# Patient Record
Sex: MALE | Race: WHITE | Employment: UNEMPLOYED | URBAN - METROPOLITAN AREA
[De-identification: names, ages, dates, MRNs, and addresses within clinical notes are randomized per-mention and may not be internally consistent; named-entity substitution may affect disease eponyms.]

---

## 2020-10-18 ENCOUNTER — HOSPITAL ENCOUNTER (EMERGENCY)
Age: 55
Discharge: HOME | End: 2020-10-19
Attending: STUDENT IN AN ORGANIZED HEALTH CARE EDUCATION/TRAINING PROGRAM
Payer: COMMERCIAL

## 2020-10-18 PROCEDURE — 85652 RBC SED RATE AUTOMATED: CPT

## 2020-10-18 PROCEDURE — 99284 EMERGENCY DEPT VISIT MOD MDM: CPT

## 2020-10-18 ASSESSMENT — PAIN DESCRIPTION - LOCATION: LOCATION: HEAD;NECK

## 2020-10-18 ASSESSMENT — PAIN SCALES - GENERAL: PAINLEVEL_OUTOF10: 5

## 2020-10-18 ASSESSMENT — PAIN DESCRIPTION - PAIN TYPE: TYPE: ACUTE PAIN;CHRONIC PAIN

## 2020-10-19 ENCOUNTER — APPOINTMENT (OUTPATIENT)
Dept: CT IMAGING | Age: 55
End: 2020-10-19
Payer: COMMERCIAL

## 2020-10-19 VITALS
WEIGHT: 165 LBS | TEMPERATURE: 98.5 F | BODY MASS INDEX: 23.1 KG/M2 | HEART RATE: 65 BPM | DIASTOLIC BLOOD PRESSURE: 82 MMHG | SYSTOLIC BLOOD PRESSURE: 126 MMHG | HEIGHT: 71 IN | OXYGEN SATURATION: 98 % | RESPIRATION RATE: 18 BRPM

## 2020-10-19 PROCEDURE — 6370000000 HC RX 637 (ALT 250 FOR IP): Performed by: STUDENT IN AN ORGANIZED HEALTH CARE EDUCATION/TRAINING PROGRAM

## 2020-10-19 PROCEDURE — 70450 CT HEAD/BRAIN W/O DYE: CPT

## 2020-10-19 PROCEDURE — 72125 CT NECK SPINE W/O DYE: CPT

## 2020-10-19 RX ORDER — ACETAMINOPHEN 500 MG
1000 TABLET ORAL ONCE
Status: COMPLETED | OUTPATIENT
Start: 2020-10-19 | End: 2020-10-19

## 2020-10-19 RX ADMIN — ACETAMINOPHEN 1000 MG: 500 TABLET ORAL at 00:55

## 2020-10-19 ASSESSMENT — ENCOUNTER SYMPTOMS
PHOTOPHOBIA: 0
BACK PAIN: 0
VOMITING: 0
SHORTNESS OF BREATH: 0
NAUSEA: 0
RHINORRHEA: 0
ABDOMINAL PAIN: 0
SORE THROAT: 0

## 2020-10-19 NOTE — ED PROVIDER NOTES
[address] ED  EMERGENCY DEPARTMENT ENCOUNTER      Pt Name: Sammi Greene  MRN: [id number]  [place] 1965  Date of evaluation: 10/18/2020  Provider: Maurice Beaver, [address] [place] [address]       Chief Complaint   Patient presents with    Fall     Pt c/o fall on Monday, states he has had constant ringing in ears, migraine, and neck pain and tightness since the fall. HISTORY OF PRESENT ILLNESS   (Location/Symptom, Timing/Onset, Context/Setting, Quality, Duration, Modifying Factors, Severity)  Note limiting factors. Sammi Greene is a 54 y.o. male history of hypertension, seizure disorder,, headaches who presents to the emergency department complaining of headache, ringing in ears. Patient reports history of longstanding tinnitus however states has been worsening over the last week since a fall. Was no loss of consciousness, patient not on blood thinners. Patient states he has a bad right knee and states this felt like his knee gave out on him while he was in the bathroom 1 week ago, patient did fall hit the back of his neck and head. Is complaining today of mild headache not worse headache of life not thunderclap in presentation. Has been gradually worsening over the last 1 week. Denies focal deficit no numbness weakness in arms or legs. Denies vision change. Denies hearing loss. Patient is complaining of midline cervical spine pain. Nursing Notes were reviewed.     PAST MEDICAL HISTORY     Past Medical History:   Diagnosis Date    Back injury     Hypertension     Movement disorder     MRSA (methicillin resistant staph aureus) culture positive 9/10/12    knee abscess    Seizures (HCC)          SURGICAL HISTORY       Past Surgical History:   Procedure Laterality Date    UPPER GASTROINTESTINAL ENDOSCOPY  9/10/12         CURRENT MEDICATIONS       Previous Medications    ACETAMINOPHEN (TYLENOL) 325 MG TABLET    Take 650 mg by mouth every 6 hours as needed for Pain LANSOPRAZOLE (PREVACID) 30 MG DELAYED RELEASE CAPSULE    Take 1 capsule by mouth daily    ONDANSETRON (ZOFRAN) 4 MG TABLET    Take 1 tablet by mouth every 8 hours as needed for Nausea or Vomiting       ALLERGIES     Vicodin [hydrocodone-acetaminophen] and Darvocet [propoxyphene n-acetaminophen]    FAMILY HISTORY       Family History   Problem Relation Age of Onset    Heart Disease Mother     Diabetes Mother     Cancer Mother     Stroke Mother     Cancer Father     Stroke Father           SOCIAL HISTORY       Social History     Socioeconomic History    Marital status:      Spouse name: None    Number of children: None    Years of education: None    Highest education level: None   Occupational History    None   Social Needs    Financial resource strain: None    Food insecurity     Worry: None     Inability: None    Transportation needs     Medical: None     Non-medical: None   Tobacco Use    Smoking status: Current Every Day Smoker     Packs/day: 0.50     Years: 37.00     Pack years: 18.50     Types: Cigarettes    Smokeless tobacco: Never Used   Substance and Sexual Activity    Alcohol use: No    Drug use: No    Sexual activity: Yes     Partners: Female   Lifestyle    Physical activity     Days per week: None     Minutes per session: None    Stress: None   Relationships    Social connections     Talks on phone: None     Gets together: None     Attends Shinto service: None     Active member of club or organization: None     Attends meetings of clubs or organizations: None     Relationship status: None    Intimate partner violence     Fear of current or ex partner: None     Emotionally abused: None     Physically abused: None     Forced sexual activity: None   Other Topics Concern    None   Social History Narrative    None       SCREENINGS                            REVIEW OF SYSTEMS    (2-9 systems for level 4, 10 or more for level 5)   Review of Systems   Constitutional: Negative for chills, fatigue and fever. HENT: Positive for tinnitus. Negative for congestion, rhinorrhea and sore throat. Eyes: Negative for photophobia and visual disturbance. Respiratory: Negative for shortness of breath. Cardiovascular: Negative for chest pain and palpitations. Gastrointestinal: Negative for abdominal pain, nausea and vomiting. Genitourinary: Negative for decreased urine volume. Musculoskeletal: Positive for neck pain and neck stiffness. Negative for back pain. Skin: Negative for rash. Neurological: Positive for headaches. Negative for weakness and numbness. Psychiatric/Behavioral: Negative for confusion. PHYSICAL EXAM    (up to 7 for level 4, 8 or more for level 5)     ED Triage Vitals   BP Temp Temp src Pulse Resp SpO2 Height Weight   -- -- -- -- -- -- -- --       Physical Exam  Constitutional:       General: He is not in acute distress. Appearance: He is not diaphoretic. HENT:      Head: Normocephalic and atraumatic. Eyes:      Extraocular Movements: Extraocular movements intact. Pupils: Pupils are equal, round, and reactive to light. Comments: There is no nystagmus   Neck:      Trachea: No tracheal deviation. Comments: There is midline cervical pain on exam  Cardiovascular:      Rate and Rhythm: Normal rate and regular rhythm. Pulmonary:      Effort: Pulmonary effort is normal. No respiratory distress. Breath sounds: No stridor. No wheezing. Abdominal:      General: There is no distension. Palpations: Abdomen is soft. Tenderness: There is no abdominal tenderness. Musculoskeletal: Normal range of motion. General: No deformity. Skin:     General: Skin is warm and dry. Neurological:      General: No focal deficit present. Mental Status: He is alert and oriented to person, place, and time. Cranial Nerves: No cranial nerve deficit. Sensory: No sensory deficit. Motor: No weakness.          DIAGNOSTIC RESULTS EKG: All EKG's are interpreted by the Emergency Department Physician who either signs or Co-signs this chart in the absence of a cardiologist.            RADIOLOGY:   Non-plain film images such as CT, Ultrasound and MRI are read by the radiologist. Plain radiographic images are visualized and preliminarily interpreted by the emergency physician. Interpretation per the Radiologist below, if available at the time of this note:    CT HEAD WO CONTRAST   Final Result   1. No evidence of acute intracranial trauma. 2. No evidence of acute abnormality of the cervical spine. 3. C3/C4 mild, C6/C7 moderate central canal stenosis secondary to   encroachment by posterior disc osteophyte complex. 4. C3/C4 moderate right and mild left, C6/C7 moderate bilateral and C7/T1   moderate left neural foraminal stenosis secondary to encroachment by disc   osteophyte complex. CT CERVICAL SPINE WO CONTRAST   Final Result   1. No evidence of acute intracranial trauma. 2. No evidence of acute abnormality of the cervical spine. 3. C3/C4 mild, C6/C7 moderate central canal stenosis secondary to   encroachment by posterior disc osteophyte complex. 4. C3/C4 moderate right and mild left, C6/C7 moderate bilateral and C7/T1   moderate left neural foraminal stenosis secondary to encroachment by disc   osteophyte complex. LABS:  Labs Reviewed - No data to display    All other labs were within normal range or not returned as of this dictation. EMERGENCY DEPARTMENT COURSE and DIFFERENTIAL DIAGNOSIS/MDM:   Hakan Molina is a 54 y.o. male who presents to the emergency department with the complaint of tenderness, headache, chronic tinnitus, worsened last 1 week as well as new headache after a fall. History of headaches not worse headache of life no focal deficit on exam with normal strength sensation. Otherwise heart regular rhythm lungs are clear abdomen soft nontender.   Cranial nerves grossly intact pupils are equal round and reactive, EOMI, no nystagmus. Does have midline cervical pain on exam will obtain CT cervical spine imaging to rule out osseous abnormality. Chronic tenderness,? Worsened last week after fall, will obtain CT head to rule out intracranial pathology. CT imaging negative for acute osseous abnormality of skull, neck. No intracranial pathology. Patient to follow-up with PCP for further work-up and management with potential audiology, physical therapy for chronic neck pain. Patient was given return precautions for headache and neck pain including sudden severe change in pain, new vision change, focal deficit numbness weakness in arms or legs, worsening neck pain or stiffness. CRITICAL CARE TIME   Total Critical Care time was at least 0 minutes, excluding separately reportable procedures. There was a high probability of clinically significant/life threatening deterioration in the patient's condition which required my urgent intervention. Clinical concern   Intervention     CONSULTS:  None    PROCEDURES:  Unless otherwise noted below, none     Procedures        FINAL IMPRESSION      1. Closed head injury, initial encounter    2. Fall, initial encounter    3. Tinnitus of both ears    4. Cervical strain, acute, initial encounter          DISPOSITION/PLAN   DISPOSITION  dc      PATIENT REFERRED TO:  No follow-up provider specified. DISCHARGE MEDICATIONS:  New Prescriptions    No medications on file     Controlled Substances Monitoring:     RX Monitoring 2/20/2017   Periodic Controlled Substance Monitoring No signs of potential drug abuse or diversion identified.        (Please note that portions of this note were completed with a voice recognition program.  Efforts were made to edit the dictations but occasionally words are mis-transcribed.)    Keaton Tamayo DO (electronically signed)  Attending Emergency Physician            Keaton Tamayo DO  10/19/20 9942

## 2024-01-31 ENCOUNTER — APPOINTMENT (OUTPATIENT)
Dept: CARDIAC CATH/INVASIVE PROCEDURES | Age: 59
End: 2024-01-31
Payer: COMMERCIAL

## 2024-01-31 ENCOUNTER — HOSPITAL ENCOUNTER (INPATIENT)
Age: 59
LOS: 2 days | Discharge: HOME | End: 2024-02-02
Attending: STUDENT IN AN ORGANIZED HEALTH CARE EDUCATION/TRAINING PROGRAM | Admitting: INTERNAL MEDICINE
Payer: COMMERCIAL

## 2024-01-31 ENCOUNTER — APPOINTMENT (OUTPATIENT)
Dept: GENERAL RADIOLOGY | Age: 59
End: 2024-01-31
Payer: COMMERCIAL

## 2024-01-31 DIAGNOSIS — I21.3 ST ELEVATION MYOCARDIAL INFARCTION (STEMI), UNSPECIFIED ARTERY (HCC): ICD-10-CM

## 2024-01-31 DIAGNOSIS — R07.9 CHEST PAIN, UNSPECIFIED TYPE: Primary | ICD-10-CM

## 2024-01-31 PROBLEM — I21.19 ACUTE INFERIOR MYOCARDIAL INFARCTION (HCC): Status: ACTIVE | Noted: 2024-01-31

## 2024-01-31 LAB
ALBUMIN SERPL-MCNC: 4.2 G/DL (ref 3.4–5)
ALBUMIN/GLOB SERPL: 1.6 {RATIO} (ref 1.1–2.2)
ALP SERPL-CCNC: 71 U/L (ref 40–129)
ALT SERPL-CCNC: 11 U/L (ref 10–40)
ANION GAP SERPL CALCULATED.3IONS-SCNC: 11 MMOL/L (ref 3–16)
AST SERPL-CCNC: 16 U/L (ref 15–37)
BASOPHILS # BLD: 0.1 K/UL (ref 0–0.2)
BASOPHILS NFR BLD: 0.5 %
BILIRUB SERPL-MCNC: <0.2 MG/DL (ref 0–1)
BUN SERPL-MCNC: 12 MG/DL (ref 7–20)
CALCIUM SERPL-MCNC: 9.6 MG/DL (ref 8.3–10.6)
CHLORIDE SERPL-SCNC: 103 MMOL/L (ref 99–110)
CO2 SERPL-SCNC: 25 MMOL/L (ref 21–32)
CREAT SERPL-MCNC: 0.9 MG/DL (ref 0.9–1.3)
DEPRECATED RDW RBC AUTO: 14.2 % (ref 12.4–15.4)
EKG ATRIAL RATE: 46 BPM
EKG ATRIAL RATE: 67 BPM
EKG DIAGNOSIS: NORMAL
EKG DIAGNOSIS: NORMAL
EKG P AXIS: 79 DEGREES
EKG P AXIS: 81 DEGREES
EKG P-R INTERVAL: 178 MS
EKG P-R INTERVAL: 184 MS
EKG Q-T INTERVAL: 396 MS
EKG Q-T INTERVAL: 442 MS
EKG QRS DURATION: 102 MS
EKG QRS DURATION: 92 MS
EKG QTC CALCULATION (BAZETT): 386 MS
EKG QTC CALCULATION (BAZETT): 418 MS
EKG R AXIS: 23 DEGREES
EKG R AXIS: 79 DEGREES
EKG T AXIS: 58 DEGREES
EKG T AXIS: 84 DEGREES
EKG VENTRICULAR RATE: 46 BPM
EKG VENTRICULAR RATE: 67 BPM
EOSINOPHIL # BLD: 0.2 K/UL (ref 0–0.6)
EOSINOPHIL NFR BLD: 1.6 %
GFR SERPLBLD CREATININE-BSD FMLA CKD-EPI: >60 ML/MIN/{1.73_M2}
GLUCOSE SERPL-MCNC: 117 MG/DL (ref 70–99)
HCT VFR BLD AUTO: 44.1 % (ref 40.5–52.5)
HGB BLD-MCNC: 14.9 G/DL (ref 13.5–17.5)
INR PPP: 1.05 (ref 0.84–1.16)
LYMPHOCYTES # BLD: 3.3 K/UL (ref 1–5.1)
LYMPHOCYTES NFR BLD: 30.7 %
MCH RBC QN AUTO: 30.5 PG (ref 26–34)
MCHC RBC AUTO-ENTMCNC: 33.8 G/DL (ref 31–36)
MCV RBC AUTO: 90.1 FL (ref 80–100)
MONOCYTES # BLD: 0.7 K/UL (ref 0–1.3)
MONOCYTES NFR BLD: 6.7 %
NEUTROPHILS # BLD: 6.5 K/UL (ref 1.7–7.7)
NEUTROPHILS NFR BLD: 60.5 %
NT-PROBNP SERPL-MCNC: 90 PG/ML (ref 0–124)
PLATELET # BLD AUTO: 239 K/UL (ref 135–450)
PMV BLD AUTO: 7.6 FL (ref 5–10.5)
POC ACT LR: 194 SEC
POC ACT LR: 232 SEC
POTASSIUM SERPL-SCNC: 4.1 MMOL/L (ref 3.5–5.1)
PROT SERPL-MCNC: 6.9 G/DL (ref 6.4–8.2)
PROTHROMBIN TIME: 13.7 SEC (ref 11.5–14.8)
RBC # BLD AUTO: 4.9 M/UL (ref 4.2–5.9)
SODIUM SERPL-SCNC: 139 MMOL/L (ref 136–145)
TROPONIN, HIGH SENSITIVITY: 28 NG/L (ref 0–22)
TROPONIN, HIGH SENSITIVITY: 30 NG/L (ref 0–22)
WBC # BLD AUTO: 10.7 K/UL (ref 4–11)

## 2024-01-31 PROCEDURE — 96374 THER/PROPH/DIAG INJ IV PUSH: CPT

## 2024-01-31 PROCEDURE — 93458 L HRT ARTERY/VENTRICLE ANGIO: CPT | Performed by: INTERNAL MEDICINE

## 2024-01-31 PROCEDURE — 85025 COMPLETE CBC W/AUTO DIFF WBC: CPT

## 2024-01-31 PROCEDURE — 85610 PROTHROMBIN TIME: CPT

## 2024-01-31 PROCEDURE — B2111ZZ FLUOROSCOPY OF MULTIPLE CORONARY ARTERIES USING LOW OSMOLAR CONTRAST: ICD-10-PCS | Performed by: INTERNAL MEDICINE

## 2024-01-31 PROCEDURE — 99285 EMERGENCY DEPT VISIT HI MDM: CPT

## 2024-01-31 PROCEDURE — 2709999900 HC NON-CHARGEABLE SUPPLY

## 2024-01-31 PROCEDURE — 6370000000 HC RX 637 (ALT 250 FOR IP): Performed by: INTERNAL MEDICINE

## 2024-01-31 PROCEDURE — 80053 COMPREHEN METABOLIC PANEL: CPT

## 2024-01-31 PROCEDURE — 6370000000 HC RX 637 (ALT 250 FOR IP)

## 2024-01-31 PROCEDURE — C1725 CATH, TRANSLUMIN NON-LASER: HCPCS

## 2024-01-31 PROCEDURE — C1894 INTRO/SHEATH, NON-LASER: HCPCS

## 2024-01-31 PROCEDURE — C1874 STENT, COATED/COV W/DEL SYS: HCPCS

## 2024-01-31 PROCEDURE — 2500000003 HC RX 250 WO HCPCS

## 2024-01-31 PROCEDURE — 80061 LIPID PANEL: CPT

## 2024-01-31 PROCEDURE — C1760 CLOSURE DEV, VASC: HCPCS

## 2024-01-31 PROCEDURE — 6360000002 HC RX W HCPCS: Performed by: STUDENT IN AN ORGANIZED HEALTH CARE EDUCATION/TRAINING PROGRAM

## 2024-01-31 PROCEDURE — 2580000003 HC RX 258: Performed by: INTERNAL MEDICINE

## 2024-01-31 PROCEDURE — B41F1ZZ FLUOROSCOPY OF RIGHT LOWER EXTREMITY ARTERIES USING LOW OSMOLAR CONTRAST: ICD-10-PCS | Performed by: INTERNAL MEDICINE

## 2024-01-31 PROCEDURE — 83880 ASSAY OF NATRIURETIC PEPTIDE: CPT

## 2024-01-31 PROCEDURE — 93458 L HRT ARTERY/VENTRICLE ANGIO: CPT

## 2024-01-31 PROCEDURE — 36415 COLL VENOUS BLD VENIPUNCTURE: CPT

## 2024-01-31 PROCEDURE — 027135Z DILATION OF CORONARY ARTERY, TWO ARTERIES WITH TWO DRUG-ELUTING INTRALUMINAL DEVICES, PERCUTANEOUS APPROACH: ICD-10-PCS | Performed by: INTERNAL MEDICINE

## 2024-01-31 PROCEDURE — C1887 CATHETER, GUIDING: HCPCS

## 2024-01-31 PROCEDURE — 92928 PRQ TCAT PLMT NTRAC ST 1 LES: CPT | Performed by: INTERNAL MEDICINE

## 2024-01-31 PROCEDURE — 6360000002 HC RX W HCPCS: Performed by: INTERNAL MEDICINE

## 2024-01-31 PROCEDURE — 99152 MOD SED SAME PHYS/QHP 5/>YRS: CPT

## 2024-01-31 PROCEDURE — C1769 GUIDE WIRE: HCPCS

## 2024-01-31 PROCEDURE — 71045 X-RAY EXAM CHEST 1 VIEW: CPT

## 2024-01-31 PROCEDURE — 85347 COAGULATION TIME ACTIVATED: CPT

## 2024-01-31 PROCEDURE — 99222 1ST HOSP IP/OBS MODERATE 55: CPT | Performed by: INTERNAL MEDICINE

## 2024-01-31 PROCEDURE — 92941 PRQ TRLML REVSC TOT OCCL AMI: CPT

## 2024-01-31 PROCEDURE — 99291 CRITICAL CARE FIRST HOUR: CPT | Performed by: INTERNAL MEDICINE

## 2024-01-31 PROCEDURE — 84484 ASSAY OF TROPONIN QUANT: CPT

## 2024-01-31 PROCEDURE — 99153 MOD SED SAME PHYS/QHP EA: CPT

## 2024-01-31 PROCEDURE — 99152 MOD SED SAME PHYS/QHP 5/>YRS: CPT | Performed by: INTERNAL MEDICINE

## 2024-01-31 PROCEDURE — 6360000002 HC RX W HCPCS

## 2024-01-31 PROCEDURE — 4A023N7 MEASUREMENT OF CARDIAC SAMPLING AND PRESSURE, LEFT HEART, PERCUTANEOUS APPROACH: ICD-10-PCS | Performed by: INTERNAL MEDICINE

## 2024-01-31 PROCEDURE — 93005 ELECTROCARDIOGRAM TRACING: CPT | Performed by: INTERNAL MEDICINE

## 2024-01-31 PROCEDURE — 6360000004 HC RX CONTRAST MEDICATION: Performed by: INTERNAL MEDICINE

## 2024-01-31 PROCEDURE — 2000000000 HC ICU R&B

## 2024-01-31 PROCEDURE — 6370000000 HC RX 637 (ALT 250 FOR IP): Performed by: STUDENT IN AN ORGANIZED HEALTH CARE EDUCATION/TRAINING PROGRAM

## 2024-01-31 PROCEDURE — 93005 ELECTROCARDIOGRAM TRACING: CPT | Performed by: STUDENT IN AN ORGANIZED HEALTH CARE EDUCATION/TRAINING PROGRAM

## 2024-01-31 RX ORDER — NITROGLYCERIN 20 MG/100ML
5-200 INJECTION INTRAVENOUS CONTINUOUS
Status: DISCONTINUED | OUTPATIENT
Start: 2024-01-31 | End: 2024-02-01

## 2024-01-31 RX ORDER — ALPRAZOLAM 0.5 MG/1
0.25 TABLET ORAL 2 TIMES DAILY PRN
Status: DISCONTINUED | OUTPATIENT
Start: 2024-01-31 | End: 2024-02-02 | Stop reason: HOSPADM

## 2024-01-31 RX ORDER — ATROPINE SULFATE 1 MG/ML
0.5 INJECTION, SOLUTION INTRAVENOUS
Status: DISPENSED | OUTPATIENT
Start: 2024-01-31 | End: 2024-02-01

## 2024-01-31 RX ORDER — HEPARIN SODIUM 1000 [USP'U]/ML
2000 INJECTION, SOLUTION INTRAVENOUS; SUBCUTANEOUS PRN
Status: DISCONTINUED | OUTPATIENT
Start: 2024-01-31 | End: 2024-01-31

## 2024-01-31 RX ORDER — SODIUM CHLORIDE 0.9 % (FLUSH) 0.9 %
5-40 SYRINGE (ML) INJECTION EVERY 12 HOURS SCHEDULED
Status: DISCONTINUED | OUTPATIENT
Start: 2024-01-31 | End: 2024-02-02 | Stop reason: HOSPADM

## 2024-01-31 RX ORDER — ONDANSETRON 2 MG/ML
4 INJECTION INTRAMUSCULAR; INTRAVENOUS EVERY 6 HOURS PRN
Status: DISCONTINUED | OUTPATIENT
Start: 2024-01-31 | End: 2024-02-02 | Stop reason: HOSPADM

## 2024-01-31 RX ORDER — HEPARIN SODIUM 1000 [USP'U]/ML
4000 INJECTION, SOLUTION INTRAVENOUS; SUBCUTANEOUS ONCE
Status: COMPLETED | OUTPATIENT
Start: 2024-01-31 | End: 2024-01-31

## 2024-01-31 RX ORDER — SODIUM CHLORIDE 9 MG/ML
INJECTION, SOLUTION INTRAVENOUS PRN
Status: DISCONTINUED | OUTPATIENT
Start: 2024-01-31 | End: 2024-02-02 | Stop reason: HOSPADM

## 2024-01-31 RX ORDER — VALSARTAN 80 MG/1
40 TABLET ORAL EVERY 12 HOURS SCHEDULED
Status: DISCONTINUED | OUTPATIENT
Start: 2024-02-01 | End: 2024-02-02 | Stop reason: HOSPADM

## 2024-01-31 RX ORDER — MORPHINE SULFATE 2 MG/ML
2 INJECTION, SOLUTION INTRAMUSCULAR; INTRAVENOUS
Status: ACTIVE | OUTPATIENT
Start: 2024-01-31 | End: 2024-02-01

## 2024-01-31 RX ORDER — ASPIRIN 81 MG/1
324 TABLET, CHEWABLE ORAL ONCE
Status: COMPLETED | OUTPATIENT
Start: 2024-01-31 | End: 2024-01-31

## 2024-01-31 RX ORDER — SODIUM CHLORIDE 9 MG/ML
INJECTION, SOLUTION INTRAVENOUS CONTINUOUS
Status: ACTIVE | OUTPATIENT
Start: 2024-01-31 | End: 2024-01-31

## 2024-01-31 RX ORDER — ACETAMINOPHEN 325 MG/1
650 TABLET ORAL EVERY 4 HOURS PRN
Status: DISCONTINUED | OUTPATIENT
Start: 2024-01-31 | End: 2024-02-02 | Stop reason: HOSPADM

## 2024-01-31 RX ORDER — ATORVASTATIN CALCIUM 80 MG/1
80 TABLET, FILM COATED ORAL NIGHTLY
Status: DISCONTINUED | OUTPATIENT
Start: 2024-01-31 | End: 2024-02-02 | Stop reason: HOSPADM

## 2024-01-31 RX ORDER — HEPARIN SODIUM 1000 [USP'U]/ML
4000 INJECTION, SOLUTION INTRAVENOUS; SUBCUTANEOUS PRN
Status: DISCONTINUED | OUTPATIENT
Start: 2024-01-31 | End: 2024-01-31

## 2024-01-31 RX ORDER — EPTIFIBATIDE 0.75 MG/ML
2 INJECTION, SOLUTION INTRAVENOUS CONTINUOUS
Status: DISPENSED | OUTPATIENT
Start: 2024-01-31 | End: 2024-01-31

## 2024-01-31 RX ORDER — SODIUM CHLORIDE 0.9 % (FLUSH) 0.9 %
5-40 SYRINGE (ML) INJECTION PRN
Status: DISCONTINUED | OUTPATIENT
Start: 2024-01-31 | End: 2024-02-02 | Stop reason: HOSPADM

## 2024-01-31 RX ORDER — ACETAMINOPHEN 500 MG
1000 TABLET ORAL
Status: COMPLETED | OUTPATIENT
Start: 2024-01-31 | End: 2024-01-31

## 2024-01-31 RX ORDER — NITROGLYCERIN 0.4 MG/1
0.4 TABLET SUBLINGUAL EVERY 5 MIN PRN
Status: DISCONTINUED | OUTPATIENT
Start: 2024-01-31 | End: 2024-01-31

## 2024-01-31 RX ORDER — ASPIRIN 81 MG/1
81 TABLET, CHEWABLE ORAL DAILY
Status: DISCONTINUED | OUTPATIENT
Start: 2024-02-01 | End: 2024-02-02 | Stop reason: HOSPADM

## 2024-01-31 RX ORDER — HEPARIN SODIUM 10000 [USP'U]/100ML
5-30 INJECTION, SOLUTION INTRAVENOUS CONTINUOUS
Status: DISCONTINUED | OUTPATIENT
Start: 2024-01-31 | End: 2024-01-31

## 2024-01-31 RX ORDER — CLOPIDOGREL BISULFATE 75 MG/1
75 TABLET ORAL DAILY
Status: DISCONTINUED | OUTPATIENT
Start: 2024-02-01 | End: 2024-02-02 | Stop reason: HOSPADM

## 2024-01-31 RX ADMIN — HEPARIN SODIUM 4000 UNITS: 1000 INJECTION INTRAVENOUS; SUBCUTANEOUS at 09:45

## 2024-01-31 RX ADMIN — EPTIFIBATIDE 2 MCG/KG/MIN: 0.75 INJECTION INTRAVENOUS at 10:50

## 2024-01-31 RX ADMIN — ACETAMINOPHEN 1000 MG: 500 TABLET ORAL at 07:42

## 2024-01-31 RX ADMIN — ASPIRIN 324 MG: 81 TABLET, CHEWABLE ORAL at 09:46

## 2024-01-31 RX ADMIN — ATORVASTATIN CALCIUM 80 MG: 80 TABLET, FILM COATED ORAL at 20:14

## 2024-01-31 RX ADMIN — NITROGLYCERIN 0.4 MG: 0.4 TABLET, ORALLY DISINTEGRATING SUBLINGUAL at 07:42

## 2024-01-31 RX ADMIN — EPTIFIBATIDE 2 MCG/KG/MIN: 0.75 INJECTION INTRAVENOUS at 16:28

## 2024-01-31 RX ADMIN — IOPAMIDOL 200 ML: 755 INJECTION, SOLUTION INTRAVENOUS at 11:17

## 2024-01-31 RX ADMIN — SODIUM CHLORIDE: 9 INJECTION, SOLUTION INTRAVENOUS at 19:42

## 2024-01-31 RX ADMIN — ACETAMINOPHEN 650 MG: 325 TABLET ORAL at 20:14

## 2024-01-31 RX ADMIN — NITROGLYCERIN 0.4 MG: 0.4 TABLET, ORALLY DISINTEGRATING SUBLINGUAL at 07:58

## 2024-01-31 ASSESSMENT — PAIN DESCRIPTION - FREQUENCY
FREQUENCY: CONTINUOUS

## 2024-01-31 ASSESSMENT — PAIN DESCRIPTION - PAIN TYPE
TYPE: ACUTE PAIN

## 2024-01-31 ASSESSMENT — ENCOUNTER SYMPTOMS
EYES NEGATIVE: 1
VOMITING: 0
ABDOMINAL PAIN: 0
COUGH: 0
ALLERGIC/IMMUNOLOGIC NEGATIVE: 1
CHEST TIGHTNESS: 1
SHORTNESS OF BREATH: 0
TROUBLE SWALLOWING: 0
NAUSEA: 1
DIARRHEA: 0
PHOTOPHOBIA: 0

## 2024-01-31 ASSESSMENT — PAIN DESCRIPTION - LOCATION
LOCATION: ARM;CHEST
LOCATION: CHEST;ARM
LOCATION: GROIN
LOCATION: GROIN
LOCATION: CHEST;ARM

## 2024-01-31 ASSESSMENT — PAIN DESCRIPTION - DESCRIPTORS
DESCRIPTORS: PRESSURE

## 2024-01-31 ASSESSMENT — PAIN SCALES - GENERAL
PAINLEVEL_OUTOF10: 5
PAINLEVEL_OUTOF10: 5
PAINLEVEL_OUTOF10: 4
PAINLEVEL_OUTOF10: 4
PAINLEVEL_OUTOF10: 5

## 2024-01-31 ASSESSMENT — PAIN - FUNCTIONAL ASSESSMENT: PAIN_FUNCTIONAL_ASSESSMENT: 0-10

## 2024-01-31 ASSESSMENT — PAIN DESCRIPTION - ORIENTATION
ORIENTATION: LEFT

## 2024-01-31 NOTE — ED PROVIDER NOTES
in his father and mother; Diabetes in his mother; Heart Disease in his mother; Stroke in his father and mother.  He reports that he has been smoking cigarettes. He has a 18.5 pack-year smoking history. He has never used smokeless tobacco. He reports that he does not drink alcohol and does not use drugs.    Medications     Previous Medications    ACETAMINOPHEN (TYLENOL) 325 MG TABLET    Take 650 mg by mouth every 6 hours as needed for Pain    LANSOPRAZOLE (PREVACID) 30 MG DELAYED RELEASE CAPSULE    Take 1 capsule by mouth daily    ONDANSETRON (ZOFRAN) 4 MG TABLET    Take 1 tablet by mouth every 8 hours as needed for Nausea or Vomiting       Allergies     He is allergic to penicillins, vicodin [hydrocodone-acetaminophen], and darvocet [propoxyphene n-acetaminophen].    Physical Exam     INITIAL VITALS: BP: 126/87, Temp: 97.8 °F (36.6 °C), Pulse: 63, Respirations: 18, SpO2: 96 %   Physical Exam  Constitutional:       General: He is in acute distress.      Appearance: He is ill-appearing.   HENT:      Head: Normocephalic and atraumatic.      Nose: Nose normal. No congestion.      Mouth/Throat:      Mouth: Mucous membranes are moist.      Pharynx: Oropharynx is clear.   Eyes:      Extraocular Movements: Extraocular movements intact.      Conjunctiva/sclera: Conjunctivae normal.      Pupils: Pupils are equal, round, and reactive to light.   Cardiovascular:      Pulses: Normal pulses.      Heart sounds: Normal heart sounds. No murmur heard.     Comments: Equal pulses in all 4 extremities  Pulmonary:      Effort: Pulmonary effort is normal. No respiratory distress.      Breath sounds: Normal breath sounds.   Chest:      Chest wall: Tenderness present.   Abdominal:      General: Abdomen is flat.      Palpations: Abdomen is soft.      Tenderness: There is no abdominal tenderness. There is no guarding or rebound.   Musculoskeletal:         General: Normal range of motion.      Cervical back: Normal range of motion and neck  supple.      Right lower leg: No edema.      Left lower leg: No edema.   Skin:     General: Skin is warm and dry.      Capillary Refill: Capillary refill takes less than 2 seconds.   Neurological:      General: No focal deficit present.      Mental Status: He is alert and oriented to person, place, and time.      Cranial Nerves: No cranial nerve deficit.                    Hilario Perez MD  01/31/24 0944

## 2024-01-31 NOTE — PROGRESS NOTES
Pt. admitted to room 4508 from Cath lab. Pt. sinus nba on room air. Fem site having some bloody drainage but still soft. No complaints of pain at this time. Pt. wiped down with chlorhexidine and sacral heart placed. ICU team made aware of this patient. Bed alarm on and call light within reach.

## 2024-01-31 NOTE — PROGRESS NOTES
4 Eyes Admission Assessment     I agree as the admission nurse that 2 RN's have performed a thorough Head to Toe Skin Assessment on the patient. ALL assessment sites listed below have been assessed on admission.       Areas assessed by both nurses: yes  [x]   Head, Face, and Ears   [x]   Shoulders, Back, and Chest  [x]   Arms, Elbows, and Hands   [x]   Coccyx, Sacrum, and Ischium  [x]   Legs, Feet, and Heels        Does the Patient have Skin Breakdown?  Yes a wound was noted on the Admission Assessment and an LDA was Initiated documentation include the Melissa-wound, Wound Assessment, Measurements, Dressing Treatment, Drainage, and Color\",   -scattered scabs  -blanchable redness on sacrum        Richard Prevention initiated:  No   Wound Care Orders initiated:  No      C nurse consulted for Pressure Injury (Stage 3,4, Unstageable, DTI, NWPT, and Complex wounds) or Richard score 18 or lower:  No      Nurse 1 eSignature: Electronically signed by Chandni Mercer RN on 1/31/24 at 11:28 AM EST      Nurse 2 eSignature: Electronically signed by Lisa King RN on 1/31/24 at 11:30 AM EST

## 2024-01-31 NOTE — CONSULTS
ICU CONSULT       Hospital Day: 0                                                          Admit Date: 1/31/2024    PCP: Latrell Gibbs MD                                  CC: Chest Pain (Pt reports CP x3 days, states worse last night. Took aspirin and went to bed. This AM wife was bringing pt to ED and he passed out, she called 911 and he woke up and was given 1 nitro. Pain went from 10 to a 5. )       HISTORY OF PRESENT ILLNESS:     HPI:  Patient is a 58 y.o. M w/ PMHx of cardiac arrest in 1999, HTN, GERD, Epilepsy who p/w to the ED via EMS w/ CP. Reports that it started \"a couple days ago\" that felt pressure like, 5/10, mostly on the L-side. No reported SOB or radiation of pain. He didn't pay too much mind to it and said it went in and out. He said the pain would present mostly with activity. He works on cars, making transmissions and engines. He says yesterday evening, 01/30, he experienced 10/10 CP that felt like sharp and pressure like. He took an aspirin and tried to sleep it off, but woke up this morning with 10/10, he could not move L-arm, was extremely diaphoretic. He also had a really bad headache and nausea. His wife took him from [place] up to [place], but he was going in and out and the tightness felt worse, so they called EMS to get them. Denied any fevers, chills, diarrhea, vomiting, weakness in lower extremities, or changes in vision or hearing prior to episodes of CP.     ED course:  - Vitals stable  - Labs WNL  - Troponins: 28 -> 30  - Pro-BNP: 90  - LFTs: WNL  - INR: 1.05  - CXR: No acute cardiopulmonary disease  - First EKG normal, after activity, EKG showed acute STEMI  - Given nitro to help with pain  - Code STEMI called, sent to cath lab w/ Dr. Moreira.     PAST HISTORY:     PMHx:      Diagnosis Date    Back injury     Hypertension     Movement disorder     MRSA (methicillin resistant staph aureus) culture positive 9/10/12    knee abscess    Seizures (HCC)        PSurgHx:      Procedure  input(s): \"CRP\" in the last 72 hours.    ESR: No results for input(s): \"SEDRATE\" in the last 72 hours.      Microbiology:  Results       ** No results found for the last 336 hours. **             COVID-19: No results for input(s): \"COVID19\" in the last 72 hours.    U/A: No results for input(s): \"NITRITE\", \"COLORU\", \"PHUR\", \"LABCAST\", \"WBCUA\", \"RBCUA\", \"MUCUS\", \"TRICHOMONAS\", \"YEAST\", \"BACTERIA\", \"CLARITYU\", \"SPECGRAV\", \"LEUKOCYTESUR\", \"UROBILINOGEN\", \"BILIRUBINUR\", \"BLOODU\", \"GLUCOSEU\", \"KETONES\", \"AMORPHOUS\" in the last 72 hours.    Radiology:  XR CHEST PORTABLE   Final Result   1.  No acute cardiopulmonary findings.          EKG Interpretation:    Rhythm: sinus bradycardia  Rate: normal and 40-50  Axis: normal  Ectopy: none  Conduction: normal  ST Segments: elevation in  II, III, and aVf  T Waves: no acute change  Q Waves: none    Clinical Impression: myocardial infarction  anterior    Ruddy Daniels MD     MEDICATIONS     No current facility-administered medications on file prior to encounter.     Current Outpatient Medications on File Prior to Encounter   Medication Sig Dispense Refill    acetaminophen (TYLENOL) 325 MG tablet Take 650 mg by mouth every 6 hours as needed for Pain      ondansetron (ZOFRAN) 4 MG tablet Take 1 tablet by mouth every 8 hours as needed for Nausea or Vomiting 18 tablet 0    lansoprazole (PREVACID) 30 MG delayed release capsule Take 1 capsule by mouth daily 30 capsule 0         Scheduled Meds:   sodium chloride flush  5-40 mL IntraVENous 2 times per day    atorvastatin  80 mg Oral Nightly    [START ON 2/1/2024] valsartan  40 mg Oral 2 times per day    [START ON 2/1/2024] clopidogrel  75 mg Oral Daily    [START ON 2/1/2024] aspirin  81 mg Oral Daily      Continuous Infusions:   nitroGLYCERIN 20 mcg/min (01/31/24 1236)    sodium chloride 125 mL/hr at 01/31/24 1111    sodium chloride      eptifibatide 2 mcg/kg/min (01/31/24 1050)     PRN Meds:sodium chloride flush, sodium chloride,

## 2024-01-31 NOTE — ED NOTES
After ambulation to restroom pts chest pain increased from 4 to 7/10. Repeat EKG obtained and given to MD. Cath lab initiated at this time.      Rosalia Rome, RN  01/31/24 0953

## 2024-01-31 NOTE — CARE COORDINATION
Case management is following peripherally for discharge planning. The chart was reviewed. Mr. Bazzi is from home with his spouse. He is independent with self care and functional mobility at baseline. [facility] and Dr Gibbs is his PCP. It is anticipated he will be able to return home with no new needs. Please contact CM with questions or concerns.    Edilia Salinas, RN  [phone]

## 2024-01-31 NOTE — PROGRESS NOTES
01/31/24 1157   Encounter Summary   Encounter Overview/Reason  Crisis   Service Provided For: Patient and family together   Support System Family members   Last Encounter  01/31/24  (fransiscoed)   Complexity of Encounter High   Begin Time 0945   End Time  1015   Total Time Calculated 30 min   Assessment/Intervention/Outcome   Assessment Anxious   Intervention Active listening;Explored/Affirmed feelings, thoughts, concerns;Nurtured Hope   Outcome Comfort;Expressed feelings, needs, and concerns;Expressed Gratitude;Receptive     Family was also escorted to the the Cath Lab waiting room. No other needs at this time.  Staff , Pradeep Guzman MA, B[facility]

## 2024-01-31 NOTE — H&P
Interventional cardiology    Patient presented to the emergency room with some chest pain.  First EKG was nondiagnostic for STEMI second EKG after returning from the bathroom showed ST elevation in the inferior leads heart rate 49.  Blood pressure is 150/100    Physical exam soft S1-S2 no S3 gallop bradycardic rhythm lungs clear abdomen nondistended extremities no clubbing or edema.    Neurologic intact  Patient denies any significant medical allergies.  He has no chronic medical history.  He smokes a pack a day at least.    Emergency evaluation reveals Mallampati score 2 ASA score 2      Patient is critically ill clutching his chest he appears to have unstable angina with possible inferior STEMI.  Risk benefits alternatives briefly explained to the patient he wishes to proceed with angiography patient is critically ill critical care time 35 minutes

## 2024-01-31 NOTE — PROCEDURES
Patient:  Prasanth Bazzi  1965    Referring Doctor:  Latrell Gibbs MD     Procedure:  Cardiac Cath and PCI     Indication: inferior STEMI    Start time 0958  End time 1032.    2 mg versed IVP and 150 MCG fentanyl in divided doses given by my  order.    Meds given by qualified independent observer Channing QUINTANA.  The cath team monitored the Cardiopulmonary status of the pt for the entire procedure.      After informed consent was obtained and  History and exam reviewed the patient was taken to the cardiac cath lab. The patient had the right groin prepped and draped in sterile fashion. The groin was anesthetized with 1% Xylocaine. Using a thin walled needle the right femoral artery was entered and and .035 mm guide wire was inserted. A 6 Turkmen arterial introducer was advanced through the needle ane the wire was removed. The sheath was aspirated and flushed with normal saline.    A 5 Fr. left Carol catheter was advanced through the sheath over a guide wire and advanced under fluoroscopic guidance into the ascending aorta. The wire was withdrawn and the catheter was aspirated and flushed with normal saline. The catheter was then advanced into the ostium of the left coronary artery under fluoroscopic guidance.    Multiple cine images were obtained in different projections of the left coronary artery.  The catheter was then withdrawn.     A 6 Fr. JR 4 guide was subsequently advanced  Through the sheath over a wire and advanced under fluoroscopic guidance in to the ascending aorta. The guide wire was removed and the catheter was aspirated and flushed and subsequently advanced in to the ostium of the right coronary artery.     Multiple cine images were then obtained of the right coronary artery.    The catheter was then withdrawn.    RCA in LV with LVEDP 12.  No LVG.    The catheter was then reconnected to a pressure system for a pullback pressure analysis of the aortic valve.    The catheter had a guide wire

## 2024-01-31 NOTE — PROGRESS NOTES
Patient's fem site still oozing blood. Pressure held for 20 minutes and transparent dressing placed. No blood noted after dressing placed.

## 2024-01-31 NOTE — PROGRESS NOTES
findings.          Access:   -Central Access Day #:  None                                   -Peripheral Access Day#:2  -Arterial line Day#: None                                   Guzman Day#: None  NGT Day#:  None                                             ETT Day#: None  Vent Settings:    / / /     Medications:     No current facility-administered medications on file prior to encounter.     Current Outpatient Medications on File Prior to Encounter   Medication Sig Dispense Refill    acetaminophen (TYLENOL) 325 MG tablet Take 650 mg by mouth every 6 hours as needed for Pain      ondansetron (ZOFRAN) 4 MG tablet Take 1 tablet by mouth every 8 hours as needed for Nausea or Vomiting 18 tablet 0    lansoprazole (PREVACID) 30 MG delayed release capsule Take 1 capsule by mouth daily 30 capsule 0       Scheduled Meds:   potassium bicarb-citric acid  20 mEq Oral Once    sodium chloride flush  5-40 mL IntraVENous 2 times per day    atorvastatin  80 mg Oral Nightly    valsartan  40 mg Oral 2 times per day    clopidogrel  75 mg Oral Daily    aspirin  81 mg Oral Daily     Continuous Infusions:   nitroGLYCERIN 15 mcg/min (02/01/24 0609)    sodium chloride 5 mL/hr at 01/31/24 1942     PRN Meds:sodium chloride flush, sodium chloride, acetaminophen, atropine, morphine, ondansetron, ALPRAZolam      Assessment/Plan:     Prasanth Bazzi is a 58 y.o. male who p/w CP. Patient was admitted to the ICU for acute inferior STEMI s/p 2 stents in RCA.    Anterior STEMI   S/p 2 stents in RCA  Presented w/ worsening CP, radiation to L arm, diaphoresis & N. On admission, labs WNL, vitals stable. First EKG was normal. Cardiac pain based on description, so given Nitro, which helped with pain. Went to bathroom, but began experiencing worsening CP. EKG showed ST elevations in leads II, III and aVR. Code STEMI called and Dr. Moreira took patient to cath lab, placed 2 stents in RCA. On integrillin and nitro gtt. Labs pending in AM. Started on ARB,  DAPT, high-dose lipitor and ASA.   - Continue ASA 81 mg qdaily  - Continue high dose Lipitor 80 mg qnightly  - Continue Plavix 75 mg BID  - Continue Valsartan 40 mg BID  - Labs ok  - Off integrillin  - On nitro gtt     Seizure disorder  Reported seizure disorder per patient and chart review. Was seeing Dr. Cantu from [facility].  appointment w/ Dr Harden on 03/18/2016 - was on zonisamide, Tegretol, Keppra, Dilantin and Topamax. Eventually placed on Neurontin, but neurologist felt that was not suitable. He was started on Lamictal, he stopped 2/2 insurance issues. He reports Neurontin though, so unsure when he stopped taking Lamictal. He had a seizure a month ago and says he \"deals with them.\" Episodes are LOC, drops to ground, eye rolls back in head, violent jerking, prolonged up to 30 minutes - 1 hour. Previously had signs he was about to have one, such as floaters, spacing out, have retained consciousness with jerking as well. Overall, very complicated picture.   - Referral OP for seizures  - No seizures during admission     HTN  - On Valsartan 40 mg BID per cardiology     GERD  - Can add PPI if has symptoms of reflux    Code Status: Full Code  FEN: ADULT DIET; Regular; Low Fat/Low Chol/High Fiber/2 gm Na  PPX: Lovenox  DISPO: ICU    Ruddy Daniels MD  Internal Medicine, PGY-1  2/1/2024  7:25 AM      This patient has been staffed and discussed with Dr. Hilario Wilde.     Patient seen, examined and discussed with the resident and I agree with the assessment and plan as edited above.    Doing well after his STEMI with PCI to RCA.    Disposition per cardiology.    Hilario Wilde MD

## 2024-02-01 LAB
ANION GAP SERPL CALCULATED.3IONS-SCNC: 11 MMOL/L (ref 3–16)
BUN SERPL-MCNC: 8 MG/DL (ref 7–20)
CALCIUM SERPL-MCNC: 9.2 MG/DL (ref 8.3–10.6)
CHLORIDE SERPL-SCNC: 107 MMOL/L (ref 99–110)
CHOLEST SERPL-MCNC: 148 MG/DL (ref 0–199)
CO2 SERPL-SCNC: 22 MMOL/L (ref 21–32)
CREAT SERPL-MCNC: 0.8 MG/DL (ref 0.9–1.3)
DEPRECATED RDW RBC AUTO: 14.1 % (ref 12.4–15.4)
EKG ATRIAL RATE: 56 BPM
EKG DIAGNOSIS: NORMAL
EKG P AXIS: 70 DEGREES
EKG P-R INTERVAL: 190 MS
EKG Q-T INTERVAL: 424 MS
EKG QRS DURATION: 110 MS
EKG QTC CALCULATION (BAZETT): 409 MS
EKG R AXIS: -11 DEGREES
EKG T AXIS: 42 DEGREES
EKG VENTRICULAR RATE: 56 BPM
GFR SERPLBLD CREATININE-BSD FMLA CKD-EPI: >60 ML/MIN/{1.73_M2}
GLUCOSE SERPL-MCNC: 108 MG/DL (ref 70–99)
HCT VFR BLD AUTO: 41.5 % (ref 40.5–52.5)
HDLC SERPL-MCNC: 42 MG/DL (ref 40–60)
HGB BLD-MCNC: 14.1 G/DL (ref 13.5–17.5)
LDLC SERPL CALC-MCNC: 94 MG/DL
MAGNESIUM SERPL-MCNC: 1.9 MG/DL (ref 1.8–2.4)
MCH RBC QN AUTO: 30.9 PG (ref 26–34)
MCHC RBC AUTO-ENTMCNC: 34 G/DL (ref 31–36)
MCV RBC AUTO: 90.9 FL (ref 80–100)
PLATELET # BLD AUTO: 219 K/UL (ref 135–450)
PMV BLD AUTO: 7.6 FL (ref 5–10.5)
POTASSIUM SERPL-SCNC: 3.7 MMOL/L (ref 3.5–5.1)
RBC # BLD AUTO: 4.56 M/UL (ref 4.2–5.9)
SODIUM SERPL-SCNC: 140 MMOL/L (ref 136–145)
TRIGL SERPL-MCNC: 58 MG/DL (ref 0–150)
VLDLC SERPL CALC-MCNC: 12 MG/DL
WBC # BLD AUTO: 9 K/UL (ref 4–11)

## 2024-02-01 PROCEDURE — 6370000000 HC RX 637 (ALT 250 FOR IP)

## 2024-02-01 PROCEDURE — 80048 BASIC METABOLIC PNL TOTAL CA: CPT

## 2024-02-01 PROCEDURE — 85027 COMPLETE CBC AUTOMATED: CPT

## 2024-02-01 PROCEDURE — 6370000000 HC RX 637 (ALT 250 FOR IP): Performed by: INTERNAL MEDICINE

## 2024-02-01 PROCEDURE — 36415 COLL VENOUS BLD VENIPUNCTURE: CPT

## 2024-02-01 PROCEDURE — 83735 ASSAY OF MAGNESIUM: CPT

## 2024-02-01 PROCEDURE — 93010 ELECTROCARDIOGRAM REPORT: CPT | Performed by: INTERNAL MEDICINE

## 2024-02-01 PROCEDURE — 99232 SBSQ HOSP IP/OBS MODERATE 35: CPT | Performed by: INTERNAL MEDICINE

## 2024-02-01 PROCEDURE — 93306 TTE W/DOPPLER COMPLETE: CPT

## 2024-02-01 PROCEDURE — 1200000000 HC SEMI PRIVATE

## 2024-02-01 PROCEDURE — 2580000003 HC RX 258: Performed by: INTERNAL MEDICINE

## 2024-02-01 PROCEDURE — 99233 SBSQ HOSP IP/OBS HIGH 50: CPT | Performed by: INTERNAL MEDICINE

## 2024-02-01 RX ADMIN — SODIUM CHLORIDE, PRESERVATIVE FREE 10 ML: 5 INJECTION INTRAVENOUS at 20:47

## 2024-02-01 RX ADMIN — ATORVASTATIN CALCIUM 80 MG: 80 TABLET, FILM COATED ORAL at 20:47

## 2024-02-01 RX ADMIN — ASPIRIN 81 MG: 81 TABLET, CHEWABLE ORAL at 07:51

## 2024-02-01 RX ADMIN — CLOPIDOGREL BISULFATE 75 MG: 75 TABLET ORAL at 07:51

## 2024-02-01 RX ADMIN — VALSARTAN 40 MG: 80 TABLET, FILM COATED ORAL at 20:46

## 2024-02-01 RX ADMIN — VALSARTAN 40 MG: 80 TABLET, FILM COATED ORAL at 07:51

## 2024-02-01 RX ADMIN — POTASSIUM BICARBONATE 20 MEQ: 782 TABLET, EFFERVESCENT ORAL at 07:52

## 2024-02-01 ASSESSMENT — PAIN SCALES - GENERAL
PAINLEVEL_OUTOF10: 0

## 2024-02-01 ASSESSMENT — ENCOUNTER SYMPTOMS
ALLERGIC/IMMUNOLOGIC NEGATIVE: 1
SHORTNESS OF BREATH: 0
EYES NEGATIVE: 1
CHEST TIGHTNESS: 0
GASTROINTESTINAL NEGATIVE: 1

## 2024-02-01 NOTE — PLAN OF CARE
Problem: Discharge Planning  Goal: Discharge to home or other facility with appropriate resources  1/31/2024 2037 by Tommy Keenan RN  Outcome: Progressing     Problem: Pain  Goal: Verbalizes/displays adequate comfort level or baseline comfort level  1/31/2024 2037 by Tommy Keenan RN  Outcome: Progressing     Problem: Safety - Adult  Goal: Free from fall injury  1/31/2024 2037 by Tommy Keenan, RN  Outcome: Progressing     Problem: ABCDS Injury Assessment  Goal: Absence of physical injury  1/31/2024 2037 by Tommy Keenan RN  Outcome: Progressing     Problem: Skin/Tissue Integrity  Goal: Absence of new skin breakdown  Description: 1.  Monitor for areas of redness and/or skin breakdown  2.  Assess vascular access sites hourly  3.  Every 4-6 hours minimum:  Change oxygen saturation probe site  4.  Every 4-6 hours:  If on nasal continuous positive airway pressure, respiratory therapy assess nares and determine need for appliance change or resting period.  1/31/2024 2037 by Tommy Keenan, RN  Outcome: Progressing

## 2024-02-01 NOTE — PROGRESS NOTES
Bedside shift handoff report completed at this time. Neurovascular assessment completed; Right groin puncture site intact with no redness, draining, or hematoma. Site covered with transparent occlusive dressing. Patient hemodynamically stable at this time.

## 2024-02-01 NOTE — PROGRESS NOTES
Clinical Pharmacy Progress Note  Medication History     Admit Date: 1/31/2024        List of of current medications patient is taking is complete. Home Medication list in EPIC updated to reflect changes noted below.    Source of information: patient interview with help of wife at bedside    Confirmed that patient takes no Rx nor OTC medications at home.    Medications removed:   APAP prn OTC  Lansoprazole 30mg daily - from 2017  Ondansetron 4mg prn - from 2017      No current outpatient medications      Please call with any questions.  Anita Alexander PharmD., BCPS   2/1/2024 11:20 AM  Wireless: [phone]

## 2024-02-01 NOTE — PLAN OF CARE
Problem: Discharge Planning  Goal: Discharge to home or other facility with appropriate resources  Outcome: Adequate for Discharge     Problem: Pain  Goal: Verbalizes/displays adequate comfort level or baseline comfort level  Outcome: Adequate for Discharge     Problem: Safety - Adult  Goal: Free from fall injury  Outcome: Adequate for Discharge     Problem: ABCDS Injury Assessment  Goal: Absence of physical injury  Outcome: Adequate for Discharge     Problem: Skin/Tissue Integrity  Goal: Absence of new skin breakdown  Description: 1.  Monitor for areas of redness and/or skin breakdown  2.  Assess vascular access sites hourly  3.  Every 4-6 hours minimum:  Change oxygen saturation probe site  4.  Every 4-6 hours:  If on nasal continuous positive airway pressure, respiratory therapy assess nares and determine need for appliance change or resting period.  Outcome: Adequate for Discharge

## 2024-02-02 VITALS
HEART RATE: 65 BPM | WEIGHT: 170.86 LBS | RESPIRATION RATE: 16 BRPM | OXYGEN SATURATION: 96 % | HEIGHT: 71 IN | DIASTOLIC BLOOD PRESSURE: 85 MMHG | TEMPERATURE: 97.2 F | BODY MASS INDEX: 23.92 KG/M2 | SYSTOLIC BLOOD PRESSURE: 125 MMHG

## 2024-02-02 LAB
ANION GAP SERPL CALCULATED.3IONS-SCNC: 12 MMOL/L (ref 3–16)
BUN SERPL-MCNC: 10 MG/DL (ref 7–20)
CALCIUM SERPL-MCNC: 9.7 MG/DL (ref 8.3–10.6)
CHLORIDE SERPL-SCNC: 105 MMOL/L (ref 99–110)
CO2 SERPL-SCNC: 22 MMOL/L (ref 21–32)
CREAT SERPL-MCNC: 0.8 MG/DL (ref 0.9–1.3)
GFR SERPLBLD CREATININE-BSD FMLA CKD-EPI: >60 ML/MIN/{1.73_M2}
GLUCOSE SERPL-MCNC: 103 MG/DL (ref 70–99)
MAGNESIUM SERPL-MCNC: 2 MG/DL (ref 1.8–2.4)
POTASSIUM SERPL-SCNC: 3.7 MMOL/L (ref 3.5–5.1)
SODIUM SERPL-SCNC: 139 MMOL/L (ref 136–145)

## 2024-02-02 PROCEDURE — 6370000000 HC RX 637 (ALT 250 FOR IP): Performed by: STUDENT IN AN ORGANIZED HEALTH CARE EDUCATION/TRAINING PROGRAM

## 2024-02-02 PROCEDURE — 36415 COLL VENOUS BLD VENIPUNCTURE: CPT

## 2024-02-02 PROCEDURE — 99239 HOSP IP/OBS DSCHRG MGMT >30: CPT | Performed by: INTERNAL MEDICINE

## 2024-02-02 PROCEDURE — 83735 ASSAY OF MAGNESIUM: CPT

## 2024-02-02 PROCEDURE — 80048 BASIC METABOLIC PNL TOTAL CA: CPT

## 2024-02-02 PROCEDURE — 2580000003 HC RX 258: Performed by: STUDENT IN AN ORGANIZED HEALTH CARE EDUCATION/TRAINING PROGRAM

## 2024-02-02 RX ORDER — ASPIRIN 81 MG/1
81 TABLET, CHEWABLE ORAL DAILY
Qty: 30 TABLET | Refills: 3 | Status: SHIPPED | OUTPATIENT
Start: 2024-02-03

## 2024-02-02 RX ORDER — ATORVASTATIN CALCIUM 80 MG/1
80 TABLET, FILM COATED ORAL NIGHTLY
Qty: 30 TABLET | Refills: 3 | Status: SHIPPED | OUTPATIENT
Start: 2024-02-02

## 2024-02-02 RX ORDER — CLOPIDOGREL BISULFATE 75 MG/1
75 TABLET ORAL DAILY
Qty: 30 TABLET | Refills: 3 | Status: SHIPPED | OUTPATIENT
Start: 2024-02-03

## 2024-02-02 RX ORDER — VALSARTAN 40 MG/1
40 TABLET ORAL DAILY
Qty: 30 TABLET | Refills: 3 | Status: SHIPPED | OUTPATIENT
Start: 2024-02-02

## 2024-02-02 RX ADMIN — ASPIRIN 81 MG: 81 TABLET, CHEWABLE ORAL at 08:05

## 2024-02-02 RX ADMIN — CLOPIDOGREL BISULFATE 75 MG: 75 TABLET ORAL at 08:05

## 2024-02-02 RX ADMIN — VALSARTAN 40 MG: 80 TABLET, FILM COATED ORAL at 08:05

## 2024-02-02 RX ADMIN — SODIUM CHLORIDE, PRESERVATIVE FREE 10 ML: 5 INJECTION INTRAVENOUS at 08:05

## 2024-02-02 ASSESSMENT — PAIN SCALES - GENERAL
PAINLEVEL_OUTOF10: 0

## 2024-02-02 NOTE — DISCHARGE SUMMARY
[facility] Discharge Note      Patient ID: Prasanth Bazzi 58 y.o. 1965    Admission Date: 1/31/2024     Discharge Date:      Reason for Admission:  Principal Diagnosis: acute inferior wall MI.    Procedures:  No discharge procedures on file.    Brief Summary of Patient's Course:  Procedures   INSERT STENT EMANUEL CORONARY [[id number] (Custom)]        Pt presented with STEMI in ED after several days of stuttering severe chest pain.  2 hrs after ER admission pt developed inferior ST Elevation.  After RCA stenting with Aromas EMANUEL x 2 to mid RCA pt did well with no further chest pain.  LVEF normal on echo.    Stable and in good condition for discharge.  Minimal lopressor 12.5 mg q am due to HR chronically in 60- to 70s on monitor.    Follow up in about 2 weeks with me in offie.          Discharge Instructions:     Activity Limitations:  No heavy lifting.  May return to work in 7 days.  Resume activity  slowly     Diet:  low fat    Follow Up Instructions:  To office in: in about 2  weeks.  Call [phone] to make aappointment      Discharge Medications:     Medication List      START taking these medications    aspirin 81 MG chewable tablet  Take 1 tablet by mouth daily  Start taking on: February 3, 2024     atorvastatin 80 MG tablet  Commonly known as: LIPITOR  Take 1 tablet by mouth nightly     clopidogrel 75 MG tablet  Commonly known as: PLAVIX  Take 1 tablet by mouth daily  Start taking on: February 3, 2024     metoprolol tartrate 25 MG tablet  Commonly known as: LOPRESSOR  Take 0.5 tablets by mouth daily (with breakfast)     valsartan 40 MG tablet  Commonly known as: DIOVAN  Take 1 tablet by mouth daily        STOP taking these medications    acetaminophen 325 MG tablet  Commonly known as: TYLENOL     lansoprazole 30 MG delayed release capsule  Commonly known as: Prevacid     ondansetron 4 MG tablet  Commonly known as: Zofran           Where to Get Your Medications      These medications were sent to [facility]

## 2024-02-02 NOTE — PROGRESS NOTES
PIV removed. AVS reviewed w/ patient. Pt aware to  scripts. Educated on new medications as well as f/u appt on Feb 7th. PCA escorted pt to ride outside.

## 2024-02-02 NOTE — PROGRESS NOTES
Patient is alert and oriented x4.  No complaints of chest pain or SOB.   R groin site Angioseal dressing intact, no hematoma, no redness or tenderness. R distal pulses intact.   No additional needs at this time.   Call light within reach.   Standard Safety Precautions in place.

## 2024-02-02 NOTE — PLAN OF CARE
Problem: Discharge Planning  Goal: Discharge to home or other facility with appropriate resources  Outcome: Progressing  Flowsheets (Taken 2/1/2024 2000)  Discharge to home or other facility with appropriate resources:   Identify barriers to discharge with patient and caregiver   Arrange for needed discharge resources and transportation as appropriate   Identify discharge learning needs (meds, wound care, etc)   Refer to discharge planning if patient needs post-hospital services based on physician order or complex needs related to functional status, cognitive ability or social support system     Problem: Pain  Goal: Verbalizes/displays adequate comfort level or baseline comfort level  Outcome: Progressing  Flowsheets (Taken 2/1/2024 2000)  Verbalizes/displays adequate comfort level or baseline comfort level:   Encourage patient to monitor pain and request assistance   Assess pain using appropriate pain scale   Administer analgesics based on type and severity of pain and evaluate response   Implement non-pharmacological measures as appropriate and evaluate response   Consider cultural and social influences on pain and pain management   Notify Licensed Independent Practitioner if interventions unsuccessful or patient reports new pain  No complaints of pain this shift.      Problem: Safety - Adult  Goal: Free from fall injury  Outcome: Progressing  Flowsheets (Taken 2/1/2024 2000)  Free From Fall Injury: Instruct family/caregiver on patient safety     Problem: ABCDS Injury Assessment  Goal: Absence of physical injury  Outcome: Progressing  Flowsheets (Taken 2/1/2024 2000)  Absence of Physical Injury: Implement safety measures based on patient assessment     Problem: Skin/Tissue Integrity  Goal: Absence of new skin breakdown  Description: 1.  Monitor for areas of redness and/or skin breakdown  2.  Assess vascular access sites hourly  3.  Every 4-6 hours minimum:  Change oxygen saturation probe site  4.  Every 4-6

## 2024-02-07 ENCOUNTER — OFFICE VISIT (OUTPATIENT)
Dept: CARDIOLOGY CLINIC | Age: 59
End: 2024-02-07
Payer: COMMERCIAL

## 2024-02-07 VITALS
SYSTOLIC BLOOD PRESSURE: 120 MMHG | WEIGHT: 170 LBS | DIASTOLIC BLOOD PRESSURE: 60 MMHG | HEART RATE: 64 BPM | BODY MASS INDEX: 23.71 KG/M2

## 2024-02-07 DIAGNOSIS — I10 PRIMARY HYPERTENSION: ICD-10-CM

## 2024-02-07 DIAGNOSIS — I21.19 ACUTE INFERIOR MYOCARDIAL INFARCTION (HCC): Primary | ICD-10-CM

## 2024-02-07 PROCEDURE — 4004F PT TOBACCO SCREEN RCVD TLK: CPT | Performed by: NURSE PRACTITIONER

## 2024-02-07 PROCEDURE — G8427 DOCREV CUR MEDS BY ELIG CLIN: HCPCS | Performed by: NURSE PRACTITIONER

## 2024-02-07 PROCEDURE — 3074F SYST BP LT 130 MM HG: CPT | Performed by: NURSE PRACTITIONER

## 2024-02-07 PROCEDURE — G8420 CALC BMI NORM PARAMETERS: HCPCS | Performed by: NURSE PRACTITIONER

## 2024-02-07 PROCEDURE — 3017F COLORECTAL CA SCREEN DOC REV: CPT | Performed by: NURSE PRACTITIONER

## 2024-02-07 PROCEDURE — 3078F DIAST BP <80 MM HG: CPT | Performed by: NURSE PRACTITIONER

## 2024-02-07 PROCEDURE — 1111F DSCHRG MED/CURRENT MED MERGE: CPT | Performed by: NURSE PRACTITIONER

## 2024-02-07 PROCEDURE — 99214 OFFICE O/P EST MOD 30 MIN: CPT | Performed by: NURSE PRACTITIONER

## 2024-02-07 PROCEDURE — G8484 FLU IMMUNIZE NO ADMIN: HCPCS | Performed by: NURSE PRACTITIONER

## 2024-02-07 RX ORDER — METOPROLOL SUCCINATE 25 MG/1
12.5 TABLET, EXTENDED RELEASE ORAL DAILY
Qty: 30 TABLET | Refills: 3 | Status: SHIPPED | OUTPATIENT
Start: 2024-02-07

## 2024-02-07 NOTE — PROGRESS NOTES
[facility]     Outpatient Follow Up Note  Dr Valentin Ayala MD,  FACC   Zora White RN, APRN,CNP CVNP      CHIEF COMPLAINT / HPI: Prasanth Bazzi is 58 y.o. male who presents today with recent inferior STEMI 1/31/2024  two kay [place] EMANUEL to mid RCA.  / long time tobacco use states smoking less   States recovered alcoholic   LDL 94 recommend to be <60    Today VSS wt stable, no c/o chest painor sOB he c/o having fatigue and feels achy,  Discussed diet and exercise /she is trying to eat better   He is pleasant,  works in a garage,  active with a farm   Continue asa lipitor cozaar and low dose metoprolol (HR 50-60s)   Wants to walk at home not do Cardiac Rehab    right groin cath site no problems     2/1/2024 TTE  Normal left ventricle size, wall thickness, and systolic function with an estimated ejection fraction of 55-60%. No regional wall motion abnormalities are seen. Normal diastolic function. There is trivial mitral regurgitation. There is mild tricuspid valve regurgitation      I spent a total of 50 minutes and greater than 50% of the time was spent counseling with patient  coordinating care regarding her diagnosis, reviewing labs, cardiac work up, treatments and plan of care.    Past Medical History:   Diagnosis Date    Back injury     Hypertension     Movement disorder     MRSA (methicillin resistant staph aureus) culture positive 9/10/12    knee abscess    Seizures (HCC)      Social History:    Social History     Tobacco Use   Smoking Status Every Day    Current packs/day: 0.50    Average packs/day: 0.5 packs/day for 37.0 years (18.5 ttl pk-yrs)    Types: Cigarettes   Smokeless Tobacco Never     Current Medications:  Current Outpatient Medications   Medication Sig Dispense Refill    aspirin 81 MG chewable tablet Take 1 tablet by mouth daily 30 tablet 3    atorvastatin (LIPITOR) 80 MG tablet Take 1 tablet by mouth nightly 30 tablet 3    valsartan (DIOVAN) 40 MG tablet Take 1 tablet by mouth

## 2024-03-04 PROBLEM — I25.2 HISTORY OF ACUTE MYOCARDIAL INFARCTION: Status: ACTIVE | Noted: 2024-01-31

## 2024-03-04 PROBLEM — I21.19 ACUTE INFERIOR MYOCARDIAL INFARCTION (HCC): Status: RESOLVED | Noted: 2024-01-31 | Resolved: 2024-03-04

## 2024-06-04 NOTE — TELEPHONE ENCOUNTER
Requested Prescriptions     Pending Prescriptions Disp Refills    aspirin 81 MG chewable tablet 30 tablet 3     Sig: Take 1 tablet by mouth daily    clopidogrel (PLAVIX) 75 MG tablet 30 tablet 3     Sig: Take 1 tablet by mouth daily    atorvastatin (LIPITOR) 80 MG tablet 30 tablet 3     Sig: Take 1 tablet by mouth nightly    valsartan (DIOVAN) 40 MG tablet 30 tablet 3     Sig: Take 1 tablet by mouth daily          Last Office Visit: 2/7/2024     Next Office Visit: Visit date not found

## 2024-06-05 RX ORDER — ASPIRIN 81 MG/1
81 TABLET, CHEWABLE ORAL DAILY
Qty: 30 TABLET | Refills: 3 | Status: SHIPPED | OUTPATIENT
Start: 2024-06-05

## 2024-06-05 RX ORDER — VALSARTAN 40 MG/1
40 TABLET ORAL DAILY
Qty: 30 TABLET | Refills: 3 | Status: SHIPPED | OUTPATIENT
Start: 2024-06-05

## 2024-06-05 RX ORDER — CLOPIDOGREL BISULFATE 75 MG/1
75 TABLET ORAL DAILY
Qty: 30 TABLET | Refills: 3 | Status: SHIPPED | OUTPATIENT
Start: 2024-06-05

## 2024-06-05 RX ORDER — ATORVASTATIN CALCIUM 80 MG/1
80 TABLET, FILM COATED ORAL NIGHTLY
Qty: 30 TABLET | Refills: 3 | Status: SHIPPED | OUTPATIENT
Start: 2024-06-05

## 2025-02-17 ENCOUNTER — HOSPITAL ENCOUNTER (INPATIENT)
Age: 60
LOS: 1 days | Discharge: HOME | DRG: 321 | End: 2025-02-18
Attending: EMERGENCY MEDICINE | Admitting: INTERNAL MEDICINE
Payer: COMMERCIAL

## 2025-02-17 ENCOUNTER — APPOINTMENT (OUTPATIENT)
Dept: CT IMAGING | Age: 60
DRG: 321 | End: 2025-02-17
Payer: COMMERCIAL

## 2025-02-17 ENCOUNTER — APPOINTMENT (OUTPATIENT)
Dept: GENERAL RADIOLOGY | Age: 60
DRG: 321 | End: 2025-02-17
Payer: COMMERCIAL

## 2025-02-17 DIAGNOSIS — I21.4 NSTEMI (NON-ST ELEVATED MYOCARDIAL INFARCTION) (HCC): ICD-10-CM

## 2025-02-17 DIAGNOSIS — I20.0 UNSTABLE ANGINA (HCC): Primary | ICD-10-CM

## 2025-02-17 DIAGNOSIS — I21.3 STEMI (ST ELEVATION MYOCARDIAL INFARCTION) (HCC): ICD-10-CM

## 2025-02-17 PROBLEM — I25.10 CORONARY ARTERY DISEASE: Status: ACTIVE | Noted: 2025-02-17

## 2025-02-17 LAB
ANION GAP SERPL CALCULATED.3IONS-SCNC: 14 MMOL/L (ref 3–16)
ANTI-XA UNFRAC HEPARIN: <0.1 IU/ML (ref 0.3–0.7)
BASOPHILS # BLD: 0 K/UL (ref 0–0.2)
BASOPHILS NFR BLD: 0.2 %
BUN SERPL-MCNC: 10 MG/DL (ref 7–20)
CALCIUM SERPL-MCNC: 9.3 MG/DL (ref 8.3–10.6)
CHLORIDE SERPL-SCNC: 100 MMOL/L (ref 99–110)
CO2 SERPL-SCNC: 22 MMOL/L (ref 21–32)
CREAT SERPL-MCNC: 0.7 MG/DL (ref 0.9–1.3)
D-DIMER QUANTITATIVE: <0.27 UG/ML FEU (ref 0–0.6)
DEPRECATED RDW RBC AUTO: 14.2 % (ref 12.4–15.4)
DEPRECATED RDW RBC AUTO: 14.3 % (ref 12.4–15.4)
ECHO BSA: 1.92 M2
EKG ATRIAL RATE: 55 BPM
EKG DIAGNOSIS: NORMAL
EKG P AXIS: 78 DEGREES
EKG P-R INTERVAL: 178 MS
EKG Q-T INTERVAL: 434 MS
EKG QRS DURATION: 102 MS
EKG QTC CALCULATION (BAZETT): 415 MS
EKG R AXIS: -32 DEGREES
EKG T AXIS: 6 DEGREES
EKG VENTRICULAR RATE: 55 BPM
EOSINOPHIL # BLD: 0.1 K/UL (ref 0–0.6)
EOSINOPHIL NFR BLD: 0.4 %
GFR SERPLBLD CREATININE-BSD FMLA CKD-EPI: >90 ML/MIN/{1.73_M2}
GLUCOSE SERPL-MCNC: 124 MG/DL (ref 70–99)
HCT VFR BLD AUTO: 44.9 % (ref 40.5–52.5)
HCT VFR BLD AUTO: 48.7 % (ref 40.5–52.5)
HGB BLD-MCNC: 14.8 G/DL (ref 13.5–17.5)
HGB BLD-MCNC: 16.3 G/DL (ref 13.5–17.5)
LYMPHOCYTES # BLD: 3.6 K/UL (ref 1–5.1)
LYMPHOCYTES NFR BLD: 24.4 %
MAGNESIUM SERPL-MCNC: 1.92 MG/DL (ref 1.8–2.4)
MCH RBC QN AUTO: 30 PG (ref 26–34)
MCH RBC QN AUTO: 30.5 PG (ref 26–34)
MCHC RBC AUTO-ENTMCNC: 32.9 G/DL (ref 31–36)
MCHC RBC AUTO-ENTMCNC: 33.5 G/DL (ref 31–36)
MCV RBC AUTO: 91 FL (ref 80–100)
MCV RBC AUTO: 91 FL (ref 80–100)
MONOCYTES # BLD: 1.2 K/UL (ref 0–1.3)
MONOCYTES NFR BLD: 7.8 %
NEUTROPHILS # BLD: 10 K/UL (ref 1.7–7.7)
NEUTROPHILS NFR BLD: 67.2 %
PLATELET # BLD AUTO: 250 K/UL (ref 135–450)
PLATELET # BLD AUTO: 275 K/UL (ref 135–450)
PMV BLD AUTO: 7.5 FL (ref 5–10.5)
PMV BLD AUTO: 7.7 FL (ref 5–10.5)
POC ACT LR: 242 SEC
POC ACT LR: 248 SEC
POC ACT LR: 331 SEC
POTASSIUM SERPL-SCNC: 3.5 MMOL/L (ref 3.5–5.1)
RBC # BLD AUTO: 4.93 M/UL (ref 4.2–5.9)
RBC # BLD AUTO: 5.35 M/UL (ref 4.2–5.9)
SODIUM SERPL-SCNC: 136 MMOL/L (ref 136–145)
TROPONIN, HIGH SENSITIVITY: 1233 NG/L (ref 0–22)
TROPONIN, HIGH SENSITIVITY: 1269 NG/L (ref 0–22)
WBC # BLD AUTO: 13.9 K/UL (ref 4–11)
WBC # BLD AUTO: 14.8 K/UL (ref 4–11)

## 2025-02-17 PROCEDURE — 93005 ELECTROCARDIOGRAM TRACING: CPT | Performed by: EMERGENCY MEDICINE

## 2025-02-17 PROCEDURE — C1753 CATH, INTRAVAS ULTRASOUND: HCPCS | Performed by: INTERNAL MEDICINE

## 2025-02-17 PROCEDURE — 02703EZ DILATION OF CORONARY ARTERY, ONE ARTERY WITH TWO INTRALUMINAL DEVICES, PERCUTANEOUS APPROACH: ICD-10-PCS | Performed by: INTERNAL MEDICINE

## 2025-02-17 PROCEDURE — C1874 STENT, COATED/COV W/DEL SYS: HCPCS | Performed by: INTERNAL MEDICINE

## 2025-02-17 PROCEDURE — 6360000004 HC RX CONTRAST MEDICATION: Performed by: INTERNAL MEDICINE

## 2025-02-17 PROCEDURE — 83735 ASSAY OF MAGNESIUM: CPT

## 2025-02-17 PROCEDURE — 6360000002 HC RX W HCPCS: Performed by: INTERNAL MEDICINE

## 2025-02-17 PROCEDURE — 36415 COLL VENOUS BLD VENIPUNCTURE: CPT

## 2025-02-17 PROCEDURE — 85379 FIBRIN DEGRADATION QUANT: CPT

## 2025-02-17 PROCEDURE — B2151ZZ FLUOROSCOPY OF LEFT HEART USING LOW OSMOLAR CONTRAST: ICD-10-PCS | Performed by: INTERNAL MEDICINE

## 2025-02-17 PROCEDURE — 2500000003 HC RX 250 WO HCPCS: Performed by: INTERNAL MEDICINE

## 2025-02-17 PROCEDURE — 85027 COMPLETE CBC AUTOMATED: CPT

## 2025-02-17 PROCEDURE — C1894 INTRO/SHEATH, NON-LASER: HCPCS | Performed by: INTERNAL MEDICINE

## 2025-02-17 PROCEDURE — 2000000000 HC ICU R&B

## 2025-02-17 PROCEDURE — 71275 CT ANGIOGRAPHY CHEST: CPT

## 2025-02-17 PROCEDURE — 84484 ASSAY OF TROPONIN QUANT: CPT

## 2025-02-17 PROCEDURE — 92941 PRQ TRLML REVSC TOT OCCL AMI: CPT | Performed by: INTERNAL MEDICINE

## 2025-02-17 PROCEDURE — 92978 ENDOLUMINL IVUS OCT C 1ST: CPT | Performed by: INTERNAL MEDICINE

## 2025-02-17 PROCEDURE — 6360000004 HC RX CONTRAST MEDICATION: Performed by: EMERGENCY MEDICINE

## 2025-02-17 PROCEDURE — 71046 X-RAY EXAM CHEST 2 VIEWS: CPT

## 2025-02-17 PROCEDURE — 6370000000 HC RX 637 (ALT 250 FOR IP): Performed by: INTERNAL MEDICINE

## 2025-02-17 PROCEDURE — 93458 L HRT ARTERY/VENTRICLE ANGIO: CPT | Performed by: INTERNAL MEDICINE

## 2025-02-17 PROCEDURE — 99153 MOD SED SAME PHYS/QHP EA: CPT | Performed by: INTERNAL MEDICINE

## 2025-02-17 PROCEDURE — C1887 CATHETER, GUIDING: HCPCS | Performed by: INTERNAL MEDICINE

## 2025-02-17 PROCEDURE — 85025 COMPLETE CBC W/AUTO DIFF WBC: CPT

## 2025-02-17 PROCEDURE — 99285 EMERGENCY DEPT VISIT HI MDM: CPT

## 2025-02-17 PROCEDURE — 2580000003 HC RX 258: Performed by: INTERNAL MEDICINE

## 2025-02-17 PROCEDURE — 96374 THER/PROPH/DIAG INJ IV PUSH: CPT

## 2025-02-17 PROCEDURE — C1725 CATH, TRANSLUMIN NON-LASER: HCPCS | Performed by: INTERNAL MEDICINE

## 2025-02-17 PROCEDURE — 99152 MOD SED SAME PHYS/QHP 5/>YRS: CPT | Performed by: INTERNAL MEDICINE

## 2025-02-17 PROCEDURE — B2111ZZ FLUOROSCOPY OF MULTIPLE CORONARY ARTERIES USING LOW OSMOLAR CONTRAST: ICD-10-PCS | Performed by: INTERNAL MEDICINE

## 2025-02-17 PROCEDURE — C1769 GUIDE WIRE: HCPCS | Performed by: INTERNAL MEDICINE

## 2025-02-17 PROCEDURE — 6360000002 HC RX W HCPCS: Performed by: EMERGENCY MEDICINE

## 2025-02-17 PROCEDURE — 4A023N7 MEASUREMENT OF CARDIAC SAMPLING AND PRESSURE, LEFT HEART, PERCUTANEOUS APPROACH: ICD-10-PCS | Performed by: INTERNAL MEDICINE

## 2025-02-17 PROCEDURE — 85520 HEPARIN ASSAY: CPT

## 2025-02-17 PROCEDURE — 80048 BASIC METABOLIC PNL TOTAL CA: CPT

## 2025-02-17 PROCEDURE — 2709999900 HC NON-CHARGEABLE SUPPLY: Performed by: INTERNAL MEDICINE

## 2025-02-17 PROCEDURE — 85347 COAGULATION TIME ACTIVATED: CPT

## 2025-02-17 PROCEDURE — 6370000000 HC RX 637 (ALT 250 FOR IP): Performed by: EMERGENCY MEDICINE

## 2025-02-17 DEVICE — STENT CORONARY ONYX FRONTIER RX 4X30 MM ZOTAROLIMUS ELUT: Type: IMPLANTABLE DEVICE | Status: FUNCTIONAL

## 2025-02-17 DEVICE — STENT CORONARY ONYX FRONTIER RX 3X38 MM ZOTAROLIMUS ELUT: Type: IMPLANTABLE DEVICE | Status: FUNCTIONAL

## 2025-02-17 RX ORDER — HEPARIN SODIUM 1000 [USP'U]/ML
4000 INJECTION, SOLUTION INTRAVENOUS; SUBCUTANEOUS ONCE
Status: COMPLETED | OUTPATIENT
Start: 2025-02-17 | End: 2025-02-17

## 2025-02-17 RX ORDER — HEPARIN SODIUM 1000 [USP'U]/ML
INJECTION, SOLUTION INTRAVENOUS; SUBCUTANEOUS PRN
Status: DISCONTINUED | OUTPATIENT
Start: 2025-02-17 | End: 2025-02-17 | Stop reason: HOSPADM

## 2025-02-17 RX ORDER — HEPARIN SODIUM 10000 [USP'U]/100ML
5-30 INJECTION, SOLUTION INTRAVENOUS CONTINUOUS
Status: DISCONTINUED | OUTPATIENT
Start: 2025-02-17 | End: 2025-02-17

## 2025-02-17 RX ORDER — HEPARIN SODIUM 1000 [USP'U]/ML
4000 INJECTION, SOLUTION INTRAVENOUS; SUBCUTANEOUS PRN
Status: DISCONTINUED | OUTPATIENT
Start: 2025-02-17 | End: 2025-02-17

## 2025-02-17 RX ORDER — ONDANSETRON 4 MG/1
4 TABLET, ORALLY DISINTEGRATING ORAL EVERY 8 HOURS PRN
Status: DISCONTINUED | OUTPATIENT
Start: 2025-02-17 | End: 2025-02-18 | Stop reason: HOSPADM

## 2025-02-17 RX ORDER — LIDOCAINE HYDROCHLORIDE 10 MG/ML
INJECTION, SOLUTION EPIDURAL; INFILTRATION; INTRACAUDAL; PERINEURAL PRN
Status: DISCONTINUED | OUTPATIENT
Start: 2025-02-17 | End: 2025-02-17 | Stop reason: HOSPADM

## 2025-02-17 RX ORDER — ASPIRIN 81 MG/1
324 TABLET, CHEWABLE ORAL ONCE
Status: COMPLETED | OUTPATIENT
Start: 2025-02-17 | End: 2025-02-17

## 2025-02-17 RX ORDER — MIDAZOLAM HYDROCHLORIDE 1 MG/ML
INJECTION, SOLUTION INTRAMUSCULAR; INTRAVENOUS PRN
Status: DISCONTINUED | OUTPATIENT
Start: 2025-02-17 | End: 2025-02-17 | Stop reason: HOSPADM

## 2025-02-17 RX ORDER — ASPIRIN 81 MG/1
81 TABLET, CHEWABLE ORAL DAILY
Status: DISCONTINUED | OUTPATIENT
Start: 2025-02-18 | End: 2025-02-18 | Stop reason: HOSPADM

## 2025-02-17 RX ORDER — ATORVASTATIN CALCIUM 80 MG/1
80 TABLET, FILM COATED ORAL DAILY
Status: DISCONTINUED | OUTPATIENT
Start: 2025-02-18 | End: 2025-02-18 | Stop reason: HOSPADM

## 2025-02-17 RX ORDER — SODIUM CHLORIDE 0.9 % (FLUSH) 0.9 %
5-40 SYRINGE (ML) INJECTION PRN
Status: DISCONTINUED | OUTPATIENT
Start: 2025-02-17 | End: 2025-02-18 | Stop reason: HOSPADM

## 2025-02-17 RX ORDER — NITROGLYCERIN 0.4 MG/1
0.4 TABLET SUBLINGUAL EVERY 5 MIN PRN
Status: DISCONTINUED | OUTPATIENT
Start: 2025-02-17 | End: 2025-02-18 | Stop reason: HOSPADM

## 2025-02-17 RX ORDER — IOPAMIDOL 755 MG/ML
INJECTION, SOLUTION INTRAVASCULAR PRN
Status: DISCONTINUED | OUTPATIENT
Start: 2025-02-17 | End: 2025-02-17 | Stop reason: HOSPADM

## 2025-02-17 RX ORDER — ACETAMINOPHEN 650 MG/1
650 SUPPOSITORY RECTAL EVERY 6 HOURS PRN
Status: DISCONTINUED | OUTPATIENT
Start: 2025-02-17 | End: 2025-02-18 | Stop reason: HOSPADM

## 2025-02-17 RX ORDER — SODIUM CHLORIDE 9 MG/ML
INJECTION, SOLUTION INTRAVENOUS PRN
Status: DISCONTINUED | OUTPATIENT
Start: 2025-02-17 | End: 2025-02-18 | Stop reason: HOSPADM

## 2025-02-17 RX ORDER — MAGNESIUM SULFATE IN WATER 40 MG/ML
2000 INJECTION, SOLUTION INTRAVENOUS PRN
Status: DISCONTINUED | OUTPATIENT
Start: 2025-02-17 | End: 2025-02-18 | Stop reason: HOSPADM

## 2025-02-17 RX ORDER — NITROGLYCERIN 20 MG/100ML
INJECTION INTRAVENOUS PRN
Status: DISCONTINUED | OUTPATIENT
Start: 2025-02-17 | End: 2025-02-17 | Stop reason: HOSPADM

## 2025-02-17 RX ORDER — HEPARIN SODIUM 1000 [USP'U]/ML
2000 INJECTION, SOLUTION INTRAVENOUS; SUBCUTANEOUS PRN
Status: DISCONTINUED | OUTPATIENT
Start: 2025-02-17 | End: 2025-02-17

## 2025-02-17 RX ORDER — SODIUM CHLORIDE 0.9 % (FLUSH) 0.9 %
5-40 SYRINGE (ML) INJECTION EVERY 12 HOURS SCHEDULED
Status: DISCONTINUED | OUTPATIENT
Start: 2025-02-17 | End: 2025-02-18 | Stop reason: HOSPADM

## 2025-02-17 RX ORDER — POTASSIUM CHLORIDE 7.45 MG/ML
10 INJECTION INTRAVENOUS PRN
Status: DISCONTINUED | OUTPATIENT
Start: 2025-02-17 | End: 2025-02-18 | Stop reason: HOSPADM

## 2025-02-17 RX ORDER — ACETAMINOPHEN 325 MG/1
650 TABLET ORAL EVERY 6 HOURS PRN
Status: DISCONTINUED | OUTPATIENT
Start: 2025-02-17 | End: 2025-02-18 | Stop reason: HOSPADM

## 2025-02-17 RX ORDER — SODIUM CHLORIDE 9 MG/ML
INJECTION, SOLUTION INTRAVENOUS CONTINUOUS
Status: ACTIVE | OUTPATIENT
Start: 2025-02-17 | End: 2025-02-18

## 2025-02-17 RX ORDER — EPTIFIBATIDE 2 MG/ML
INJECTION, SOLUTION INTRAVENOUS PRN
Status: DISCONTINUED | OUTPATIENT
Start: 2025-02-17 | End: 2025-02-17 | Stop reason: HOSPADM

## 2025-02-17 RX ORDER — EPTIFIBATIDE 0.75 MG/ML
2 INJECTION, SOLUTION INTRAVENOUS CONTINUOUS
Status: ACTIVE | OUTPATIENT
Start: 2025-02-17 | End: 2025-02-18

## 2025-02-17 RX ORDER — ONDANSETRON 2 MG/ML
4 INJECTION INTRAMUSCULAR; INTRAVENOUS EVERY 6 HOURS PRN
Status: DISCONTINUED | OUTPATIENT
Start: 2025-02-17 | End: 2025-02-18 | Stop reason: HOSPADM

## 2025-02-17 RX ORDER — VERAPAMIL HYDROCHLORIDE 2.5 MG/ML
INJECTION, SOLUTION INTRAVENOUS PRN
Status: DISCONTINUED | OUTPATIENT
Start: 2025-02-17 | End: 2025-02-17 | Stop reason: HOSPADM

## 2025-02-17 RX ORDER — POTASSIUM CHLORIDE 29.8 MG/ML
20 INJECTION INTRAVENOUS PRN
Status: DISCONTINUED | OUTPATIENT
Start: 2025-02-17 | End: 2025-02-18 | Stop reason: HOSPADM

## 2025-02-17 RX ORDER — EPTIFIBATIDE 0.75 MG/ML
INJECTION, SOLUTION INTRAVENOUS CONTINUOUS PRN
Status: COMPLETED | OUTPATIENT
Start: 2025-02-17 | End: 2025-02-17

## 2025-02-17 RX ORDER — IOPAMIDOL 755 MG/ML
75 INJECTION, SOLUTION INTRAVASCULAR
Status: COMPLETED | OUTPATIENT
Start: 2025-02-17 | End: 2025-02-17

## 2025-02-17 RX ORDER — POLYETHYLENE GLYCOL 3350 17 G/17G
17 POWDER, FOR SOLUTION ORAL DAILY PRN
Status: DISCONTINUED | OUTPATIENT
Start: 2025-02-17 | End: 2025-02-18 | Stop reason: HOSPADM

## 2025-02-17 RX ADMIN — NITROGLYCERIN 0.4 MG: 0.4 TABLET SUBLINGUAL at 19:24

## 2025-02-17 RX ADMIN — EPTIFIBATIDE 2 MCG/KG/MIN: 0.75 INJECTION, SOLUTION INTRAVENOUS at 22:00

## 2025-02-17 RX ADMIN — HEPARIN SODIUM 12 UNITS/KG/HR: 10000 INJECTION, SOLUTION INTRAVENOUS at 19:43

## 2025-02-17 RX ADMIN — IOPAMIDOL 75 ML: 755 INJECTION, SOLUTION INTRAVENOUS at 19:14

## 2025-02-17 RX ADMIN — ASPIRIN 324 MG: 81 TABLET, CHEWABLE ORAL at 19:06

## 2025-02-17 RX ADMIN — HEPARIN SODIUM 4000 UNITS: 1000 INJECTION INTRAVENOUS; SUBCUTANEOUS at 19:41

## 2025-02-17 RX ADMIN — SODIUM CHLORIDE: 9 INJECTION, SOLUTION INTRAVENOUS at 22:00

## 2025-02-17 ASSESSMENT — PAIN DESCRIPTION - LOCATION
LOCATION: CHEST
LOCATION: CHEST

## 2025-02-17 ASSESSMENT — ENCOUNTER SYMPTOMS
ABDOMINAL DISTENTION: 0
BACK PAIN: 0
NAUSEA: 0
SHORTNESS OF BREATH: 0
ABDOMINAL PAIN: 0
CHEST TIGHTNESS: 0
CONSTIPATION: 0
PHOTOPHOBIA: 0
TROUBLE SWALLOWING: 0
COUGH: 0
VOMITING: 0
CHOKING: 0
RHINORRHEA: 0
WHEEZING: 0
DIARRHEA: 0

## 2025-02-17 ASSESSMENT — PAIN DESCRIPTION - PAIN TYPE: TYPE: ACUTE PAIN

## 2025-02-17 ASSESSMENT — PAIN SCALES - GENERAL
PAINLEVEL_OUTOF10: 8
PAINLEVEL_OUTOF10: 7
PAINLEVEL_OUTOF10: 0

## 2025-02-17 ASSESSMENT — PAIN DESCRIPTION - DESCRIPTORS
DESCRIPTORS: SHARP
DESCRIPTORS: TIGHTNESS

## 2025-02-17 ASSESSMENT — PAIN - FUNCTIONAL ASSESSMENT: PAIN_FUNCTIONAL_ASSESSMENT: 0-10

## 2025-02-17 ASSESSMENT — PAIN DESCRIPTION - FREQUENCY: FREQUENCY: CONTINUOUS

## 2025-02-17 NOTE — ED TRIAGE NOTES
Pt co CP that started last night and has only gotten worse. Pt reports hx of MI. Pt reports stopping all medications last month dt insurance issues.

## 2025-02-18 ENCOUNTER — TELEPHONE (OUTPATIENT)
Dept: CARDIOLOGY CLINIC | Age: 60
End: 2025-02-18

## 2025-02-18 ENCOUNTER — APPOINTMENT (OUTPATIENT)
Age: 60
DRG: 321 | End: 2025-02-18
Attending: INTERNAL MEDICINE
Payer: COMMERCIAL

## 2025-02-18 VITALS
WEIGHT: 165.79 LBS | HEIGHT: 71 IN | DIASTOLIC BLOOD PRESSURE: 78 MMHG | RESPIRATION RATE: 24 BRPM | HEART RATE: 65 BPM | BODY MASS INDEX: 23.21 KG/M2 | OXYGEN SATURATION: 99 % | SYSTOLIC BLOOD PRESSURE: 121 MMHG | TEMPERATURE: 98.6 F

## 2025-02-18 DIAGNOSIS — I21.4 NSTEMI (NON-ST ELEVATED MYOCARDIAL INFARCTION) (HCC): Primary | ICD-10-CM

## 2025-02-18 DIAGNOSIS — I25.10 CORONARY ARTERY DISEASE DUE TO LIPID RICH PLAQUE: ICD-10-CM

## 2025-02-18 DIAGNOSIS — Z95.5 S/P CORONARY ARTERY STENT PLACEMENT: Primary | ICD-10-CM

## 2025-02-18 DIAGNOSIS — I25.83 CORONARY ARTERY DISEASE DUE TO LIPID RICH PLAQUE: ICD-10-CM

## 2025-02-18 LAB
ALBUMIN SERPL-MCNC: 4 G/DL (ref 3.4–5)
ANION GAP SERPL CALCULATED.3IONS-SCNC: 11 MMOL/L (ref 3–16)
BUN SERPL-MCNC: 13 MG/DL (ref 7–20)
CALCIUM SERPL-MCNC: 8.9 MG/DL (ref 8.3–10.6)
CHLORIDE SERPL-SCNC: 103 MMOL/L (ref 99–110)
CO2 SERPL-SCNC: 23 MMOL/L (ref 21–32)
CREAT SERPL-MCNC: 0.7 MG/DL (ref 0.9–1.3)
DEPRECATED RDW RBC AUTO: 14.1 % (ref 12.4–15.4)
ECHO AO ASC DIAM: 3.7 CM
ECHO AO ASCENDING AORTA INDEX: 1.9 CM/M2
ECHO AO ROOT DIAM: 3.5 CM
ECHO AO ROOT INDEX: 1.79 CM/M2
ECHO AV AREA PEAK VELOCITY: 2.6 CM2
ECHO AV AREA VTI: 2.5 CM2
ECHO AV AREA/BSA PEAK VELOCITY: 1.3 CM2/M2
ECHO AV AREA/BSA VTI: 1.3 CM2/M2
ECHO AV MEAN GRADIENT: 3 MMHG
ECHO AV MEAN VELOCITY: 0.8 M/S
ECHO AV PEAK GRADIENT: 5 MMHG
ECHO AV PEAK VELOCITY: 1.2 M/S
ECHO AV VELOCITY RATIO: 0.67
ECHO AV VTI: 21.8 CM
ECHO BSA: 1.92 M2
ECHO IVC PROX: 1.3 CM
ECHO LA AREA 4C: 13.3 CM2
ECHO LA DIAMETER INDEX: 1.64 CM/M2
ECHO LA DIAMETER: 3.2 CM
ECHO LA MAJOR AXIS: 4.5 CM
ECHO LA TO AORTIC ROOT RATIO: 0.91
ECHO LA VOL MOD A4C: 32 ML (ref 18–58)
ECHO LA VOLUME INDEX MOD A4C: 16 ML/M2 (ref 16–34)
ECHO LV E' LATERAL VELOCITY: 11.2 CM/S
ECHO LV E' SEPTAL VELOCITY: 8.27 CM/S
ECHO LV EDV A2C: 133 ML
ECHO LV EDV A4C: 130 ML
ECHO LV EDV INDEX A4C: 67 ML/M2
ECHO LV EDV NDEX A2C: 68 ML/M2
ECHO LV EF PHYSICIAN: 45 %
ECHO LV EJECTION FRACTION A2C: 49 %
ECHO LV EJECTION FRACTION A4C: 58 %
ECHO LV ESV A2C: 68 ML
ECHO LV ESV A4C: 55 ML
ECHO LV ESV INDEX A2C: 35 ML/M2
ECHO LV ESV INDEX A4C: 28 ML/M2
ECHO LV FRACTIONAL SHORTENING: 23 % (ref 28–44)
ECHO LV INTERNAL DIMENSION DIASTOLE INDEX: 2.67 CM/M2
ECHO LV INTERNAL DIMENSION DIASTOLIC: 5.2 CM (ref 4.2–5.9)
ECHO LV INTERNAL DIMENSION SYSTOLIC INDEX: 2.05 CM/M2
ECHO LV INTERNAL DIMENSION SYSTOLIC: 4 CM
ECHO LV IVSD: 1.2 CM (ref 0.6–1)
ECHO LV MASS 2D: 234.6 G (ref 88–224)
ECHO LV MASS INDEX 2D: 120.3 G/M2 (ref 49–115)
ECHO LV POSTERIOR WALL DIASTOLIC: 1.1 CM (ref 0.6–1)
ECHO LV RELATIVE WALL THICKNESS RATIO: 0.42
ECHO LVOT AREA: 3.8 CM2
ECHO LVOT AV VTI INDEX: 0.66
ECHO LVOT DIAM: 2.2 CM
ECHO LVOT MEAN GRADIENT: 1 MMHG
ECHO LVOT PEAK GRADIENT: 3 MMHG
ECHO LVOT PEAK VELOCITY: 0.8 M/S
ECHO LVOT STROKE VOLUME INDEX: 27.9 ML/M2
ECHO LVOT SV: 54.3 ML
ECHO LVOT VTI: 14.3 CM
ECHO MV A VELOCITY: 0.84 M/S
ECHO MV E DECELERATION TIME (DT): 158 MS
ECHO MV E VELOCITY: 0.79 M/S
ECHO MV E/A RATIO: 0.94
ECHO MV E/E' LATERAL: 7.05
ECHO MV E/E' RATIO (AVERAGED): 8.3
ECHO MV E/E' SEPTAL: 9.55
ECHO RA AREA 4C: 10.5 CM2
ECHO RA END SYSTOLIC VOLUME APICAL 4 CHAMBER INDEX BSA: 12 ML/M2
ECHO RA VOLUME: 24 ML
ECHO RV BASAL DIMENSION: 3 CM
ECHO RV FREE WALL PEAK S': 14.1 CM/S
ECHO RV LONGITUDINAL DIMENSION: 7.2 CM
ECHO RV MID DIMENSION: 2.4 CM
ECHO RV TAPSE: 1.9 CM (ref 1.7–?)
EKG ATRIAL RATE: 59 BPM
EKG DIAGNOSIS: NORMAL
EKG P AXIS: 82 DEGREES
EKG P-R INTERVAL: 174 MS
EKG Q-T INTERVAL: 412 MS
EKG QRS DURATION: 112 MS
EKG QTC CALCULATION (BAZETT): 407 MS
EKG R AXIS: -60 DEGREES
EKG T AXIS: 30 DEGREES
EKG VENTRICULAR RATE: 59 BPM
GFR SERPLBLD CREATININE-BSD FMLA CKD-EPI: >90 ML/MIN/{1.73_M2}
GLUCOSE SERPL-MCNC: 113 MG/DL (ref 70–99)
HCT VFR BLD AUTO: 44 % (ref 40.5–52.5)
HGB BLD-MCNC: 14.5 G/DL (ref 13.5–17.5)
MAGNESIUM SERPL-MCNC: 1.85 MG/DL (ref 1.8–2.4)
MCH RBC QN AUTO: 29.9 PG (ref 26–34)
MCHC RBC AUTO-ENTMCNC: 33 G/DL (ref 31–36)
MCV RBC AUTO: 90.8 FL (ref 80–100)
PHOSPHATE SERPL-MCNC: 2.2 MG/DL (ref 2.5–4.9)
PLATELET # BLD AUTO: 246 K/UL (ref 135–450)
PMV BLD AUTO: 8.1 FL (ref 5–10.5)
POTASSIUM SERPL-SCNC: 3.8 MMOL/L (ref 3.5–5.1)
RBC # BLD AUTO: 4.85 M/UL (ref 4.2–5.9)
SODIUM SERPL-SCNC: 137 MMOL/L (ref 136–145)
WBC # BLD AUTO: 13.6 K/UL (ref 4–11)

## 2025-02-18 PROCEDURE — 80069 RENAL FUNCTION PANEL: CPT

## 2025-02-18 PROCEDURE — 36415 COLL VENOUS BLD VENIPUNCTURE: CPT

## 2025-02-18 PROCEDURE — 85027 COMPLETE CBC AUTOMATED: CPT

## 2025-02-18 PROCEDURE — 6360000004 HC RX CONTRAST MEDICATION: Performed by: INTERNAL MEDICINE

## 2025-02-18 PROCEDURE — 2500000003 HC RX 250 WO HCPCS

## 2025-02-18 PROCEDURE — 83735 ASSAY OF MAGNESIUM: CPT

## 2025-02-18 PROCEDURE — 6370000000 HC RX 637 (ALT 250 FOR IP): Performed by: INTERNAL MEDICINE

## 2025-02-18 PROCEDURE — 99233 SBSQ HOSP IP/OBS HIGH 50: CPT | Performed by: INTERNAL MEDICINE

## 2025-02-18 PROCEDURE — 93005 ELECTROCARDIOGRAM TRACING: CPT | Performed by: INTERNAL MEDICINE

## 2025-02-18 PROCEDURE — C8929 TTE W OR WO FOL WCON,DOPPLER: HCPCS

## 2025-02-18 PROCEDURE — 6360000002 HC RX W HCPCS: Performed by: INTERNAL MEDICINE

## 2025-02-18 PROCEDURE — 99222 1ST HOSP IP/OBS MODERATE 55: CPT | Performed by: INTERNAL MEDICINE

## 2025-02-18 RX ORDER — SODIUM CHLORIDE 9 MG/ML
INJECTION, SOLUTION INTRAVENOUS PRN
Status: DISCONTINUED | OUTPATIENT
Start: 2025-02-18 | End: 2025-02-18 | Stop reason: HOSPADM

## 2025-02-18 RX ORDER — SODIUM CHLORIDE 0.9 % (FLUSH) 0.9 %
5-40 SYRINGE (ML) INJECTION EVERY 12 HOURS SCHEDULED
Status: DISCONTINUED | OUTPATIENT
Start: 2025-02-18 | End: 2025-02-18 | Stop reason: HOSPADM

## 2025-02-18 RX ORDER — ATORVASTATIN CALCIUM 80 MG/1
80 TABLET, FILM COATED ORAL NIGHTLY
Qty: 30 TABLET | Refills: 0 | Status: SHIPPED | OUTPATIENT
Start: 2025-02-18

## 2025-02-18 RX ORDER — METOPROLOL SUCCINATE 25 MG/1
12.5 TABLET, EXTENDED RELEASE ORAL DAILY
Qty: 30 TABLET | Refills: 3 | Status: SHIPPED | OUTPATIENT
Start: 2025-02-18

## 2025-02-18 RX ORDER — SODIUM CHLORIDE 0.9 % (FLUSH) 0.9 %
5-40 SYRINGE (ML) INJECTION PRN
Status: DISCONTINUED | OUTPATIENT
Start: 2025-02-18 | End: 2025-02-18 | Stop reason: HOSPADM

## 2025-02-18 RX ORDER — ASPIRIN 81 MG/1
81 TABLET, CHEWABLE ORAL DAILY
Qty: 90 TABLET | Refills: 0 | Status: SHIPPED | OUTPATIENT
Start: 2025-02-18

## 2025-02-18 RX ADMIN — SULFUR HEXAFLUORIDE 2 ML: 60.7; .19; .19 INJECTION, POWDER, LYOPHILIZED, FOR SUSPENSION INTRAVENOUS; INTRAVESICAL at 12:41

## 2025-02-18 RX ADMIN — TICAGRELOR 90 MG: 90 TABLET ORAL at 08:26

## 2025-02-18 RX ADMIN — ATORVASTATIN CALCIUM 80 MG: 80 TABLET, FILM COATED ORAL at 08:26

## 2025-02-18 RX ADMIN — ASPIRIN 81 MG: 81 TABLET, CHEWABLE ORAL at 08:26

## 2025-02-18 RX ADMIN — SODIUM CHLORIDE, PRESERVATIVE FREE 10 ML: 5 INJECTION INTRAVENOUS at 08:26

## 2025-02-18 RX ADMIN — EPTIFIBATIDE 2 MCG/KG/MIN: 0.75 INJECTION, SOLUTION INTRAVENOUS at 03:57

## 2025-02-18 ASSESSMENT — PAIN SCALES - GENERAL
PAINLEVEL_OUTOF10: 0
PAINLEVEL_OUTOF10: 0

## 2025-02-18 NOTE — ED PROVIDER NOTES
THE [facility]  EMERGENCY DEPARTMENT ENCOUNTER          ATTENDING PHYSICIAN NOTE       Date of evaluation: 2/17/2025    Chief Complaint     Chest Pain      History of Present Illness     Prasanth Bazzi is a 59 year old male with a history of ST elevation myocardial infarction who presents with chest pain. He experiences sharp chest pain rated at 7 to 8 out of 10, which worsens with walking and deep breaths. The pain is located on the left side of his chest and was severe enough to keep him up all night. He took two baby aspirins this morning, which did not alleviate his symptoms. He also experienced severe left arm pain last night, describing it as similar to a broken arm. This pain occurred concurrently with the chest pain. He has shortness of breath associated with the chest pain, which worsens with deep breaths. No swelling in his legs. He noted his pulse dropped to 53 during the episode of chest pain, prompting him to seek medical attention. He has been off his medications for about two months due to loss of insurance and last saw his cardiologist in February or March of last year. He has a significant past medical history of an ST elevation myocardial infarction one year ago, for which he underwent a left heart catheterization and received two [place] drug-eluting stents in the mid right coronary artery.     ASSESSMENT / PLAN  (MEDICAL DECISION MAKING)     INITIAL VITALS: BP: (!) 144/83, Temp: 97.9 °F (36.6 °C), Pulse: 52, Respirations: 18, SpO2: 99 %        Medical Decision Making  Problems Addressed:  NSTEMI (non-ST elevated myocardial infarction) (HCC): complicated acute illness or injury with systemic symptoms that poses a threat to life or bodily functions  Unstable angina (HCC): complicated acute illness or injury with systemic symptoms that poses a threat to life or bodily functions    Amount and/or Complexity of Data Reviewed  Labs: ordered. Decision-making details documented in ED

## 2025-02-18 NOTE — H&P
ICU HISTORY AND PHYSICAL       Admit Date:  2/17/2025                            Hospital Day: 1  ICU Day: 1      CC: Acute radiating chest pain    History obtained from:  the patient    SUBJECTIVE   HPI:    Mr. Prasanth Bazzi is a 59 y.o. male with a medical hx significant for HTN, HLD, prior MI with 2 EMANUEL in RCA (1/2024), and seizure disorder otherwise as listed in the MHx table below, who presented from home to the ED on 2/17 with 7/10 sharp chest pain described as \"someone sitting on my chest\" that radiates to the left shoulder.  He states his pain is worse with deep breaths and walking.  He states that this feels very similar to his previous MI.  Additionally, he has been out of his home medications for antiplatelet and antihypertensives for two months after his insurance changed and has not been able to fill his prescriptions.  Denies fevers, chills, recent illness or sick contacts, N/V/D, HA, or vision changes.  He is saturating >90% on RA.  In ED EKG significant for NSTEMI in setting of unstable angina.  Cath lab activated and patient had EMANUEL placed.  Admitted to ICU for monitoring overnight.    Past Medical History:   Diagnosis Date    Acute inferior myocardial infarction (HCC) 1/31/2024    Back injury     Hypertension     Movement disorder     MRSA (methicillin resistant staph aureus) culture positive 9/10/12    knee abscess    Seizures (HCC)     STEMI (ST elevation myocardial infarction) ([facility]) 1/31/2024       Past Surgical History:   Procedure Laterality Date    UPPER GASTROINTESTINAL ENDOSCOPY  9/10/12       Social History:   The patient lives at home with spouse  Alcohol: prior heavy use  Illicit drugs: denies  Tobacco:  daily smoker since 10 yrs old, now 1/2 ppd    Family History   Problem Relation Age of Onset    Heart Disease Mother     Diabetes Mother     Cancer Mother     Stroke Mother     Cancer Father     Stroke Father          Allergies:   Allergies   Allergen Reactions    Ketorolac Nausea

## 2025-02-18 NOTE — PROGRESS NOTES
ICU Progress Note    Admit Date: 2/17/2025  Day: 2  Vent Day:   IV Access:Peripheral  IV Fluids:None  Vasopressors:None                Antibiotics:   Diet: ADULT DIET; Regular    CC:     Interval history:   Cr:0.7  Phosphorus: 2.2   Troponin : 1269 - 1233  CT chest w/wo contrast : Mild aneurysmal dilatation of ascending thoracic aorta measuring 4.1 x 4.2 cm   Echo TTE 2024 : EF:55-60%  Some episodes of bradycardia to 56-58   BP:104/70   U/O: 800 ml in 12 hours       HPI:   Mr. Prasanth Bazzi is a 59 y.o. male with a medical hx significant for HTN, HLD, prior MI with 2 EMANUEL in RCA (1/2024) and seizure.  Who presented to the ED yesterday with 7/10 sharp chest pain that radiates to the left shoulder.He has shortness of breath associated with the chest painHe noted his pulse dropped to 53 during the episode of chest pain .Additionally, he has been out of his home medications for antiplatelet and antihypertensives for two months after his insurance changed and has not been able to fill his prescriptions.  On presentation to the ED, initial EKG showed sinus bradycardia with poor R wave progression in the precordial leads, inferior infarct pattern, and inferior T wave inversions Initial high-sensitivity troponin was elevated at 1269.   patient was started on IV heparin infusion and taken to the cardiac Cath Lab for  coronary angiography and percutaneous coronary intervention.   Angiography showed :   100% in-stent thrombosis in the mid-vessel of the RCA  The LAD has sequential 30% proximal stenoses and an 80% mid-vessel stenosis   LVEF 40% with severe hypokinesis   PCI of proximal to mid-RCA with EMANUEL   PCI of mid-distal RCA with EMANUEL   Medications:     Scheduled Meds:   sodium chloride flush  5-40 mL IntraVENous 2 times per day    ticagrelor  90 mg Oral BID    aspirin  81 mg Oral Daily    atorvastatin  80 mg Oral Daily    sodium chloride flush  5-40 mL IntraVENous 2 times per day     Continuous Infusions:   sodium 
    Hospital Discharge Summary    Patient's PCP: Latrell Gibbs MD  Admit Date: 2/17/2025   Discharge Date: 2/18/2025    Admitting Physician: Dr. Eddi Wadsworth MD  Discharge Physician: Dr. Alta Griffith MD   Consults: cardiology and pulmonary/intensive care    HPI: 59 y.o. male with HTN, HLD, prior MI with 2 EMANUEL in RCA (1/2024), and seizure disorder who presented from home to the ED on 2/17 with 7/10 sharp chest pain described as \"someone sitting on my chest\" that radiates to the left shoulder.       He states his pain is worse with deep breaths and walking.  He states that this feels very similar to his previous MI.  Additionally, he has been out of his home medications for antiplatelet and antihypertensives for two months after his insurance changed and has not been able to fill his prescriptions.  Denies fevers, chills, recent illness or sick contacts, N/V/D, HA, or vision changes.  He is saturating >90% on RA.      In ED EKG significant for NSTEMI in setting of unstable angina.  Cath lab activated and patient had EMANUEL placed.  Was admitted post procedure to ICU       Brief hospital course:  Given the concern of the patients presentation and the concern of the possible multi-factorial etiology contributing to patients symptomatology. Patient was admitted and evaluated and found to have:       Discharge Diagnoses:     CAD with NSTEMI: Present on admission  Cardiomyopathy, probably ischemic secondary to CAD/MI  - Patient with medical history significant for MI at age 32, RCA occlusion requiring EMANUEL x2 placement in mid RCA who presented with acute radiating chest pain described as \"someone sitting one my chest\".    - Has been off DOAC for two months since his insurance changes and has been unable to refill his medications.  - S/p PCI with one EMANUEL placed day of admission 2/17/2020  - Brilinta 90 mg BID, ASA 81 mg daily  - Atorvastain 80 mg nightly  - TTE: EF 45%. Left ventricle size is normal. Normal wall thickness. 
    Progress Note  Admit Date: 2/17/2025           CC: F/U for chest pain     59 y.o. male with HTN, HLD, prior MI with 2 EMANUEL in RCA (1/2024), and seizure disorder who presented from home to the ED on 2/17 with 7/10 sharp chest pain described as \"someone sitting on my chest\" that radiates to the left shoulder.      He states his pain is worse with deep breaths and walking.  He states that this feels very similar to his previous MI.  Additionally, he has been out of his home medications for antiplatelet and antihypertensives for two months after his insurance changed and has not been able to fill his prescriptions.  Denies fevers, chills, recent illness or sick contacts, N/V/D, HA, or vision changes.  He is saturating >90% on RA.     In ED EKG significant for NSTEMI in setting of unstable angina.  Cath lab activated and patient had EMANUEL placed.  Was admitted post procedure to ICU       Interval History: No new complaints    Review of Systems - Negative except  as in HPI.     Scheduled Medications:    sodium chloride flush  5-40 mL IntraVENous 2 times per day    ticagrelor  90 mg Oral BID    aspirin  81 mg Oral Daily    atorvastatin  80 mg Oral Daily    sodium chloride flush  5-40 mL IntraVENous 2 times per day      PRN Medications: sodium chloride flush, sodium chloride, nitroGLYCERIN, sodium chloride flush, sodium chloride, potassium chloride **OR** potassium chloride, magnesium sulfate, ondansetron **OR** ondansetron, polyethylene glycol, acetaminophen **OR** acetaminophen  Diet: ADULT DIET; Regular    Continuous Infusions:   sodium chloride      sodium chloride         PHYSICAL EXAM:  BP 99/65   Pulse 62   Temp 98.9 °F (37.2 °C) (Oral)   Resp 25   Ht 1.803 m (5' 11\")   Wt 75.2 kg (165 lb 12.6 oz)   SpO2 98%   BMI 23.12 kg/m²   No results for input(s): \"POCGLU\" in the last 72 hours.    Intake/Output Summary (Last 24 hours) at 2/18/2025 1320  Last data filed at 2/18/2025 0600  Gross per 24 hour   Intake 797.19 
4 Eyes Skin Assessment     NAME:  Prasanth Bazzi  YOB: 1965  MEDICAL RECORD NUMBER:  [id number]    The patient is being assessed for  Cath Lab Post-Op    I agree that at least one RN has performed a thorough Head to Toe Skin Assessment on the patient. ALL assessment sites listed below have been assessed.      Areas assessed by both nurses:    Head, Face, Ears, Shoulders, Back, Chest, Arms, Elbows, Hands, Sacrum. Buttock, Coccyx, Ischium, Legs. Feet and Heels, and Under Medical Devices         Does the Patient have a Wound? Scar R Knee       Richard Prevention initiated by RN: No  Wound Care Orders initiated by RN: No    Pressure Injury (Stage 3,4, Unstageable, DTI, NWPT, and Complex wounds) if present, place Wound referral order by RN under : No    New Ostomies, if present place, Ostomy referral order under : No     Nurse 1 eSignature: Electronically signed by Davian Aviles RN on 2/17/25 at 10:14 PM EST    **SHARE this note so that the co-signing nurse can place an eSignature**    Nurse 2 eSignature: Electronically signed by Rafi Coronado RN on 2/17/25 at 11:13 PM EST    
AVS reviewed w/ pt, all questions answered, and pt verbalized understanding.  
  BUN 10 13   CREATININE 0.7* 0.7*     LIVER PROFILE: No results for input(s): \"AST\", \"ALT\", \"LIPASE\", \"AMYLASE\", \"BILIDIR\", \"BILITOT\", \"ALKPHOS\" in the last 72 hours.    Invalid input(s): \"ALB\"  PT/INR: No results for input(s): \"PROTIME\", \"INR\" in the last 72 hours.  APTT: No results for input(s): \"APTT\" in the last 72 hours.  BNP:  No results for input(s): \"BNP\" in the last 72 hours.  IMAGING:     [facility] 2/17/2025  2.  Coronary anatomy:  A. Left main artery: The left main artery bifurcates into the left anterior descending artery and left circumflex artery.  The left main artery has minor luminal irregularities.  B. Left anterior descending artery: Transapical vessel which gives rise to multiple small diagonal arteries.  The LAD has sequential 30% proximal stenoses and an 80% mid-vessel stenosis immediately after the bifurcation of the first septal .  Beyond this, there is another 50% stenosis in the mid-vessel.  C. Left circumflex artery: Non-dominant vessel that gives rise to 2 obtuse marginal arteries and 2 posterolateral branches.  The LCx has a 20% proximal stenosis, 40% mid-vessel stenosis, and 30% distal stenosis.  The OM1 is a very small vessel with a high origin and has a 60% ostial stenosis.  The OM2 is a large, branching vessel and has a 50% proximal stenosis.  The left posterolateral branches have mild disease.  D. Right coronary artery: Dominant vessel.  The RCA has sequential 30% and 70% proximal stenoses and a 100% in-stent thrombosis in the mid-vessel.    Assessment/Plan:  Principal Problem:  NSTEMI (non-ST elevated myocardial infarction)   NSTEMI 2/17/2025 s/p PCI of distal RCA with kay Sigourney 3.0 x 38 mm EMANUEL, prox RCA with 4.0 x 30 mm EMANUEL   Brilinta / asa / statin   No BB d/t bradycardia   No ACE/ARB due to hypotension  Echo pending  Plan for staged PCI of mid LAD as outpt with Dr. Nunes at [place] (considerably closer to pts home) will schedule prior to d/c     Coronary artery

## 2025-02-18 NOTE — PROCEDURES
CARDIAC CATHETERIZATION REPORT    Date of Procedure: 2/17/2025  : Eric Nunes DO  Primary Indication: NSTEMI    Procedures Performed:  1. Coronary angiography  2. Left heart catheterization  3. Left ventriculography  4. IVUS of RCA  5. PTCA of mid-RCA in-stent restenosis  6. PCI of proximal to mid-RCA with EMANUEL (stent overlaps with previously placed mid-RCA stent  7. PCI of mid-distal RCA with EMANUEL (to treat distal RCA disease and area of in-stent restenosis)  8. Moderate conscious sedation    Procedural Details:  Access: Local anesthetic was given and access was obtained in the right radial artery using a micropuncture technique and a 6F Terumo Slender Sheath was placed without difficulty.   Diagnostic: A 5F Ultra catheter was used to perform selective right and left coronary angiography.  A 5F pigtial catheter was used to perform the left heart catheterization and left ventriculography.  No significant gradient was observed on pull-back of the catheter across the aortic valve.    Intervention: Therapeutic ACT was achieved with the administration of IV heparin.  Using a 6F AR1 guide catheter, a 0.014 Prowater wire was advanced into the RPDA.  The mid-RCA in-stent thrombosis was dilated with a 3.0 x 15 mm balloon.  This resulted in reconstitution of LINDEN 3 flow in the vessel, but there remained thrombus in the distal small caliber right posterolateral branch and also some residual thrombus in the mid-RCA.  There was some disease present in the distal RCA beyond the stented segment and in-stent restenosis in the distal portion of the stent.  Next, a 6F Opticross IVUS catheter was advanced over the wire and pull-back IVUS demonstrated mixed calcified and non-calcified plaque distal to the previously placed stents and proximal to the previously placed stents in the mid-RCA.  The previously placed stents were significantly undersized in an area of the vessel where the reference vessel diameter was ~4 mm.  There

## 2025-02-18 NOTE — DISCHARGE SUMMARY
INTERNAL MEDICINE DEPARTMENT  DISCHARGE SUMMARY    Patient ID: Prasanth Bazzi                                             Discharge Date: 2/18/2025   Patient's PCP: Latrell Gibbs MD                                          Discharge Physician: Behram Ahmed Khan, MD MD  Admit Date: 2/17/2025   Admitting Physician: Eddi Wadsworth MD    PROBLEMS DURING HOSPITALIZATION:  Present on Admission:   NSTEMI (non-ST elevated myocardial infarction) (HCC)   Coronary artery disease      DISCHARGE DIAGNOSES:  NSTEMI    Hospital Course:    Mr. Prasanth Bazzi is a 59-year-old male with a history of hypertension, hyperlipidemia, prior myocardial infarction (MI) with two drug-eluting stents (EMANUEL) in the RCA, and seizures. He presented with sharp chest pain radiating to the left shoulder, shortness of breath, and a pulse drop to 53 bpm. He had been off his prescribed antiplatelet and antihypertensive medications for two months due to insurance issues.  On presentation, his EKG showed sinus bradycardia, inferior infarct pattern, and elevated troponin levels (1269 ng/L). He was started on IV heparin and underwent coronary angiography, revealing 100% in-stent thrombosis in the RCA, and significant stenosis in the LAD. PCI was performed in the RCA with EMANUEL placement.  Post-procedure, Mr. MAC was stable, with resolved chest pain and a decrease in troponin levels.     He was discharged on aspirin, lisinopril, atorvastatin, and metoprolol, ticagrelor, with instructions for follow-up with cardiology in one week for staged PCI of his LAD lesion. He was counseled on lifestyle changes and medication adherence.    The following issues were addressed during hospitalization:    NSTEMI s/p PCI, now being discharged on ASA, ticagrelor, Atorvastatin and Betablocker    On the basis of discharge, patient reported feeling stable.  The patient was found to not be in any acute distress, with vital signs within normal limits, and no abnormalities on

## 2025-02-18 NOTE — CARE COORDINATION
Case Management Assessment  Initial Evaluation    Date/Time of Evaluation: 2/18/2025 12:12 PM  Assessment Completed by: DONALD Dawson    If patient is discharged prior to next notation, then this note serves as note for discharge by case management.    Patient Name: Prasanth Bazzi                   YOB: 1965  Diagnosis: Unstable angina (HCC) [I20.0]  STEMI (ST elevation myocardial infarction) (HCC) [I21.3]  NSTEMI (non-ST elevated myocardial infarction) (HCC) [I21.4]                   Date / Time: 2/17/2025  6:52 PM    Patient Admission Status: Inpatient   Readmission Risk (Low < 19, Mod (19-27), High > 27): Readmission Risk Score: 4.8    Current PCP: Latrell Gibbs MD  PCP verified by CM? Yes    Chart Reviewed: Yes      History Provided by: Patient  Patient Orientation: Alert and Oriented    Patient Cognition: Alert    Hospitalization in the last 30 days (Readmission):  No    If yes, Readmission Assessment in CM Navigator will be completed.    Advance Directives:      Code Status: Full Code   Patient's Primary Decision Maker is: Legal Next of Kin      Discharge Planning:    Patient lives with: Spouse/Significant Other Type of Home: House  Primary Care Giver: Self  Patient Support Systems include: Family Members, Spouse/Significant Other   Current Financial resources: None  Current community resources: None  Current services prior to admission: None            Current DME:              Type of Home Care services:  None    ADLS  Prior functional level: Independent in ADLs/IADLs  Current functional level: Independent in ADLs/IADLs    PT AM-PAC:   /24  OT AM-PAC:   /24    Family can provide assistance at DC: Yes  Would you like Case Management to discuss the discharge plan with any other family members/significant others, and if so, who? Yes  Plans to Return to Present Housing: Yes  Other Identified Issues/Barriers to RETURNING to current housing: Medical Clearance   Potential Assistance needed

## 2025-02-18 NOTE — PLAN OF CARE
Problem: Pain  Goal: Verbalizes/displays adequate comfort level or baseline comfort level  2/18/2025 1653 by Feliciano Ryan, RN  Outcome: Completed  2/18/2025 0412 by Davian Aviles, RN  Outcome: Progressing

## 2025-02-18 NOTE — DISCHARGE SUMMARY
Hospital Discharge Summary    Patient's PCP: Latrell Gibbs MD  Admit Date: 2/17/2025   Discharge Date: 2/18/2025    Admitting Physician: Dr. Eddi Wadsworth MD  Discharge Physician: Dr. Alta Griffith MD   Consults: cardiology and pulmonary/intensive care    HPI: 59 y.o. male with HTN, HLD, prior MI with 2 EMANUEL in RCA (1/2024), and seizure disorder who presented from home to the ED on 2/17 with 7/10 sharp chest pain described as \"someone sitting on my chest\" that radiates to the left shoulder.       He states his pain is worse with deep breaths and walking.  He states that this feels very similar to his previous MI.  Additionally, he has been out of his home medications for antiplatelet and antihypertensives for two months after his insurance changed and has not been able to fill his prescriptions.  Denies fevers, chills, recent illness or sick contacts, N/V/D, HA, or vision changes.  He is saturating >90% on RA.      In ED EKG significant for NSTEMI in setting of unstable angina.  Cath lab activated and patient had EMANUEL placed.  Was admitted post procedure to ICU       Brief hospital course:  Given the concern of the patients presentation and the concern of the possible multi-factorial etiology contributing to patients symptomatology. Patient was admitted and evaluated and found to have:       Discharge Diagnoses:     CAD with NSTEMI: Present on admission  Cardiomyopathy, probably ischemic secondary to CAD/MI  - Patient with medical history significant for MI at age 32, RCA occlusion requiring EMANUEL x2 placement in mid RCA who presented with acute radiating chest pain described as \"someone sitting one my chest\".    - Has been off DOAC for two months since his insurance changes and has been unable to refill his medications.  - S/p PCI with one EMANUEL placed day of admission 2/17/2020  - Brilinta 90 mg BID, ASA 81 mg daily  - Atorvastain 80 mg nightly  - TTE: EF 45%. Left ventricle size is normal. Normal wall thickness.

## 2025-02-18 NOTE — ED NOTES
APTT and Anti-Xa drawn and collected prior to starting heparin drip.      Leeann Mott, RN  02/17/25 0750

## 2025-02-18 NOTE — PLAN OF CARE
[facility] Hospitalist brief note  Consult received.  Case reviewed with ER physician- admit for nstemi    Full note to follow.    Latrell Gibbs MD    Thanks  CONY ROBERTO MD

## 2025-02-18 NOTE — DISCHARGE INSTRUCTIONS
Please take all of your medications as prescribed on the discharge  Please follow up with your cardiologist. Please call them with in a week to make an appointment  Please call their office if you have any questions  Please go to ED if you have any symptoms like shortness of breath, chest pain, severe abdominal pain, or altered mentation or anything that needs immediate attention.

## 2025-02-18 NOTE — TELEPHONE ENCOUNTER
Patient admitted to [facility] as NSTEMI.     Urgent PCI completed with Dr. Nunes. Need for staged PCI of mid LAD. Okay to be completed as outpt.     Pt lives considerably closer to [facility] than [facility]. Reviewed with pt, agreeable to outpt procedure in 1-2 weeks at [facility] with Dr. Nunes.

## 2025-02-19 NOTE — TELEPHONE ENCOUNTER
Pt states he had stents placed at [facility] and told to fu with [facility] in a week. Please advise where to add and call pt to schedule appropriately.

## 2025-02-19 NOTE — TELEPHONE ENCOUNTER
Left Heart Cath Patient Pre-procedure Instructions    Do NOT eat or drink anything after midnight morning of procedure    MEDICATIONS:  Your medications have been reviewed. Please follow medication instructions below  It is okay to drink a sip of water with meds morning of procedure  It is okay to take ALL medications morning of procedure    Transportation: Bring someone to drive you home.     Scheduling: Edilia KRUGER is our full time procedure . She will call you to schedule your procedure.    Allergies: Do you have an allergy to dye or contrast? No.    Labs: No additional blood work is needed prior to the day of your procedure.

## 2025-02-20 NOTE — TELEPHONE ENCOUNTER
Patient should be scheduled for follow-up appointment in clinic first to help decide timing of PCI of LAD. Need to ensure medication compliance as well before proceeding.

## 2025-02-24 ENCOUNTER — OFFICE VISIT (OUTPATIENT)
Dept: CARDIOLOGY CLINIC | Age: 60
End: 2025-02-24
Payer: COMMERCIAL

## 2025-02-24 VITALS
BODY MASS INDEX: 23.99 KG/M2 | HEART RATE: 63 BPM | DIASTOLIC BLOOD PRESSURE: 66 MMHG | SYSTOLIC BLOOD PRESSURE: 104 MMHG | WEIGHT: 172 LBS

## 2025-02-24 DIAGNOSIS — E78.2 MIXED HYPERLIPIDEMIA: ICD-10-CM

## 2025-02-24 DIAGNOSIS — Z98.61 CAD S/P PERCUTANEOUS CORONARY ANGIOPLASTY: Primary | ICD-10-CM

## 2025-02-24 DIAGNOSIS — I25.10 CAD S/P PERCUTANEOUS CORONARY ANGIOPLASTY: Primary | ICD-10-CM

## 2025-02-24 DIAGNOSIS — I10 PRIMARY HYPERTENSION: ICD-10-CM

## 2025-02-24 PROCEDURE — 3078F DIAST BP <80 MM HG: CPT | Performed by: NURSE PRACTITIONER

## 2025-02-24 PROCEDURE — 3074F SYST BP LT 130 MM HG: CPT | Performed by: NURSE PRACTITIONER

## 2025-02-24 PROCEDURE — 99214 OFFICE O/P EST MOD 30 MIN: CPT | Performed by: NURSE PRACTITIONER

## 2025-02-24 NOTE — PROGRESS NOTES
[place] [address]  [phone]    Primary Cardiologist: Dr Des GODWIN CAD hospital follow up    HPI:  59 y.o. with CAD/PCI, ICM, HLD, HTN and hypotension who is here for follow up after PCI of RCA. He also need staged PCI of mid LAD.    No chest pain, sob, LH/dizziness, palpitations, syncope or falls. No weight gain, edema, orthopnea, abdominal bloating or early satiety. No n/v/d, fever or GI/ bleeding. Denies right radial pain or bleeding.      He is expecting his first grandchild in March.     Past Medical History:   Diagnosis Date    Acute inferior myocardial infarction (HCC) 1/31/2024    Back injury     Hypertension     Movement disorder     MRSA (methicillin resistant staph aureus) culture positive 9/10/12    knee abscess    Seizures (HCC)     STEMI (ST elevation myocardial infarction) (HCC) 1/31/2024     Past Surgical History:   Procedure Laterality Date    CARDIAC PROCEDURE N/A 2/17/2025    Left heart cath / coronary angiography performed by Eric Nunes DO at [facility] CARDIAC CATH LAB    CARDIAC PROCEDURE N/A 2/17/2025    Percutaneous coronary intervention performed by Eric Nunes DO at [facility] CARDIAC CATH LAB    CARDIAC PROCEDURE N/A 2/17/2025    Intravascular ultrasound performed by Eric Nuens DO at [facility] CARDIAC CATH LAB    UPPER GASTROINTESTINAL ENDOSCOPY  9/10/12     Family History   Problem Relation Age of Onset    Heart Disease Mother     Diabetes Mother     Cancer Mother     Stroke Mother     Cancer Father     Stroke Father      Social History     Tobacco Use    Smoking status: Every Day     Current packs/day: 0.50     Average packs/day: 0.5 packs/day for 37.0 years (18.5 ttl pk-yrs)     Types: Cigarettes    Smokeless tobacco: Never   Vaping Use    Vaping status: Never Used   Substance Use Topics    Alcohol use: No    Drug use: No     Allergies:Ketorolac, Methocarbamol, Penicillins, Propoxyphene, Tramadol, Vicodin [hydrocodone-acetaminophen], and

## 2025-02-24 NOTE — PATIENT INSTRUCTIONS
[facility], [address], [address], [place] [address] [phone]

## 2025-03-24 ENCOUNTER — TELEPHONE (OUTPATIENT)
Dept: CARDIOLOGY CLINIC | Age: 60
End: 2025-03-24

## 2025-03-24 RX ORDER — ASPIRIN 81 MG/1
81 TABLET, CHEWABLE ORAL DAILY
Qty: 90 TABLET | Refills: 0 | Status: SHIPPED | OUTPATIENT
Start: 2025-03-24

## 2025-03-24 RX ORDER — ATORVASTATIN CALCIUM 80 MG/1
80 TABLET, FILM COATED ORAL NIGHTLY
Qty: 30 TABLET | Refills: 0 | Status: SHIPPED | OUTPATIENT
Start: 2025-03-24

## 2025-03-24 NOTE — TELEPHONE ENCOUNTER
Christy rn, May, wife called to schedule a fu appt w/[facility].  Pt had transportation problem his car broke down and then his wife car broke down.  Pt is wanting to schedule in [place] [facility] and is needing an apt soon.  Please see where the Pt could possibly be scheduled in at.  They are requesting [facility].  Thank you

## 2025-03-24 NOTE — TELEPHONE ENCOUNTER
Requested Prescriptions     Pending Prescriptions Disp Refills    atorvastatin (LIPITOR) 80 MG tablet 30 tablet 0     Sig: Take 1 tablet by mouth nightly    ticagrelor (BRILINTA) 90 MG TABS tablet 60 tablet 0     Sig: Take 1 tablet by mouth 2 times daily    aspirin 81 MG chewable tablet 90 tablet 0     Sig: Take 1 tablet by mouth daily      Last OV:  Visit date not found     Next OV: Visit date not found     Last Labs: 02/07/2025 Lipid    Last Filled: 02/18/2025 [facility]

## 2025-03-24 NOTE — TELEPHONE ENCOUNTER
Medication Refill    Medication needing refilled:  ticagrelor   atorvastatin   aspirin     Dosage of the medication:   90mg  80mg  81mg    How are you taking this medication (QD, BID, TID, QID, PRN):   Take 1 tablet by mouth 2 times daily   Take 1 tablet by mouth nightly   Take 1 tablet by mouth daily       30 or 90 day supply called in:   90 90  90    When will you run out of your medication:    Which Pharmacy are we sending the medication to?:     [facility]  [address]  [place] [address]   Phone:  [phone]  Fax:  [phone]

## 2025-04-04 ENCOUNTER — TELEPHONE (OUTPATIENT)
Dept: CARDIOLOGY CLINIC | Age: 60
End: 2025-04-04

## 2025-04-04 ENCOUNTER — OFFICE VISIT (OUTPATIENT)
Dept: CARDIOLOGY CLINIC | Age: 60
End: 2025-04-04
Payer: COMMERCIAL

## 2025-04-04 VITALS
HEIGHT: 71 IN | SYSTOLIC BLOOD PRESSURE: 136 MMHG | RESPIRATION RATE: 16 BRPM | BODY MASS INDEX: 23.24 KG/M2 | DIASTOLIC BLOOD PRESSURE: 80 MMHG | WEIGHT: 166 LBS | HEART RATE: 58 BPM

## 2025-04-04 DIAGNOSIS — R07.9 CHEST PAIN, UNSPECIFIED TYPE: ICD-10-CM

## 2025-04-04 DIAGNOSIS — I10 PRIMARY HYPERTENSION: ICD-10-CM

## 2025-04-04 DIAGNOSIS — I25.10 CAD S/P PERCUTANEOUS CORONARY ANGIOPLASTY: Primary | ICD-10-CM

## 2025-04-04 DIAGNOSIS — Z98.61 CAD S/P PERCUTANEOUS CORONARY ANGIOPLASTY: Primary | ICD-10-CM

## 2025-04-04 PROCEDURE — 93000 ELECTROCARDIOGRAM COMPLETE: CPT | Performed by: INTERNAL MEDICINE

## 2025-04-04 PROCEDURE — 99214 OFFICE O/P EST MOD 30 MIN: CPT | Performed by: INTERNAL MEDICINE

## 2025-04-04 PROCEDURE — 3079F DIAST BP 80-89 MM HG: CPT | Performed by: INTERNAL MEDICINE

## 2025-04-04 PROCEDURE — 3075F SYST BP GE 130 - 139MM HG: CPT | Performed by: INTERNAL MEDICINE

## 2025-04-04 RX ORDER — ATORVASTATIN CALCIUM 80 MG/1
80 TABLET, FILM COATED ORAL NIGHTLY
Qty: 90 TABLET | Refills: 3 | Status: SHIPPED | OUTPATIENT
Start: 2025-04-04

## 2025-04-04 RX ORDER — ASPIRIN 81 MG/1
81 TABLET, CHEWABLE ORAL DAILY
Qty: 90 TABLET | Refills: 3 | Status: SHIPPED | OUTPATIENT
Start: 2025-04-04

## 2025-04-04 RX ORDER — METOPROLOL SUCCINATE 25 MG/1
12.5 TABLET, EXTENDED RELEASE ORAL DAILY
Qty: 90 TABLET | Refills: 3 | Status: SHIPPED | OUTPATIENT
Start: 2025-04-04 | End: 2025-04-04 | Stop reason: ALTCHOICE

## 2025-04-04 NOTE — PROGRESS NOTES
[facility]  [phone]      Patient: Prasanth Bazzi  YOB: 1965         Chief Complaint   Patient presents with    Follow-up     Heart stopped two weeks ago and ambulance came. Ambulance gave Nitro. Stephan better but could not got to hospital due to 2 broken down cars. Has active chest pain every day. States since stents arm consistently hurts. Fatigue. Sob comes and goes. Under lots of stress        Referring provider: Latrell Gibbs MD    History of Present Illness:   Prasanth Bazzi is a 59 y.o. male presenting in follow up.     Today he is here with his wife. He reports he has had continued chest pain and SOB since leaving the hospital. Reports he passed out a week ago and squad was called but declined to go to ED.     With regard to medication therapy he/she has been compliant with prescribed regimen and has tolerated therapy to date.    Past Medical History:   has a past medical history of Acute inferior myocardial infarction (HCC), Back injury, Hypertension, Movement disorder, MRSA (methicillin resistant staph aureus) culture positive, Seizures ([facility]), and STEMI (ST elevation myocardial infarction) ([facility]).    Surgical History:   has a past surgical history that includes Upper gastrointestinal endoscopy (9/10/12); Cardiac procedure (N/A, 2/17/2025); Cardiac procedure (N/A, 2/17/2025); and Cardiac procedure (N/A, 2/17/2025).     Current Outpatient Medications   Medication Sig Dispense Refill    ticagrelor (BRILINTA) 90 MG TABS tablet Take 1 tablet by mouth 2 times daily 180 tablet 3    atorvastatin (LIPITOR) 80 MG tablet Take 1 tablet by mouth nightly 90 tablet 3    aspirin 81 MG chewable tablet Take 1 tablet by mouth daily 90 tablet 3     No current facility-administered medications for this visit.       Allergies:  Ketorolac, Methocarbamol, Other, Penicillins, Propoxyphene, Tramadol, Vicodin [hydrocodone-acetaminophen], and Darvocet [propoxyphene n-acetaminophen]     Social

## 2025-04-04 NOTE — PATIENT INSTRUCTIONS
STOP taking metoprolol      Left Heart Cath Patient Pre-procedure Instructions    Do NOT eat or drink anything after midnight morning of procedure    MEDICATIONS:  Your medications have been reviewed. Please follow medication instructions below  It is okay to drink a sip of water with meds morning of procedure  It is okay to take ALL medications morning of procedure    Transportation: Bring someone to drive you home.     Scheduling: Edilia KRUGER is our full time procedure . She will call you to schedule your procedure.    Allergies: Do you have an allergy to dye or contrast? No.    Labs: We need to check blood work within 30 days of your procedure (CBC & BMP). Please go to any [facility] lab to get your labs drawn prior to your procedure.

## 2025-04-04 NOTE — TELEPHONE ENCOUNTER
Pt in office today. Needs staged PCI   Discussed in office    Left Heart Cath Patient Pre-procedure Instructions    Do NOT eat or drink anything after midnight morning of procedure    MEDICATIONS:  Your medications have been reviewed. Please follow medication instructions below  It is okay to drink a sip of water with meds morning of procedure  It is okay to take ALL medications morning of procedure    Transportation: Bring someone to drive you home.     Scheduling: Edilia KRUGER is our full time procedure . She will call you to schedule your procedure.    Allergies: Do you have an allergy to dye or contrast? No.    Labs: We need to check blood work within 30 days of your procedure (CBC & BMP). Please go to any [facility] lab to get your labs drawn prior to your procedure.

## 2025-04-04 NOTE — TELEPHONE ENCOUNTER
[facility]     Time of arrival: 6:45 am     Procedure time: 8:00 am     Called and spoke to Pat and he is agreeable to date and time. Reviewed instructions and he verbalized understanding. Encouraged to call with any questions or concerns.     Published on See[employer], scheduled on [employer] and e-mailed to cath lab.

## (undated) DEVICE — CATHETER GUID 6FR 0.071IN COR AMPLATZ L 0.75 MID

## (undated) DEVICE — CATHETER GUID JR4 0.070 INX6 FRX100 CM VISTA BRT TIP ADROIT

## (undated) DEVICE — CATHETER COR DIAG PIGTAILS PIG 145 CRV 5FR 110CM 6 SIDE H

## (undated) DEVICE — GUIDEWIRE VASC L145CM DIA0035IN FLPY TIP EXTN COMPATIBLE STR

## (undated) DEVICE — CATH BLLN ANGIO 3.50X20MM NC EUPHORIA RX

## (undated) DEVICE — PAD, DEFIB, ADULT, RADIOTRANS, PHYSIO: Brand: MEDLINE

## (undated) DEVICE — CATH LAB PACK: Brand: MEDLINE INDUSTRIES, INC.

## (undated) DEVICE — TR BAND RADIAL ARTERY COMPRESSION DEVICE: Brand: TR BAND

## (undated) DEVICE — CATH BLLN ANGIO 3X20MM NC EUPHORIA RX

## (undated) DEVICE — Device: Brand: PROWATER

## (undated) DEVICE — CATH BLLN ANGIO 4X20MM NC EUPHORIA RX

## (undated) DEVICE — Device

## (undated) DEVICE — CATH BLLN ANGIO 3X15MM SC EUPHORA RX

## (undated) DEVICE — CATHETER COR DIAG 4.0 5FR 100CM 2 SIDE H

## (undated) DEVICE — CORONARY IMAGING CATHETER: Brand: OPTICROSS™ 6 HD

## (undated) DEVICE — DISPOSABLE PULLBACK SLED FOR MOTORDRIVE

## (undated) DEVICE — GLIDESHEATH SLENDER STAINLESS STEEL KIT: Brand: GLIDESHEATH SLENDER